# Patient Record
Sex: FEMALE | Race: WHITE | Employment: FULL TIME | URBAN - METROPOLITAN AREA
[De-identification: names, ages, dates, MRNs, and addresses within clinical notes are randomized per-mention and may not be internally consistent; named-entity substitution may affect disease eponyms.]

---
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
## 2021-01-07 ENCOUNTER — HOSPITAL ENCOUNTER (OUTPATIENT)
Dept: LAB | Age: 21
Discharge: HOME | End: 2021-01-07
Payer: COMMERCIAL

## 2021-01-07 LAB
ERYTHROCYTE [SEDIMENTATION RATE] IN BLOOD: 12 MM/HR (ref 0–20)
RHEUMATOID FACT SERPL-ACNC: <10 IU/ML
URATE SERPL-MCNC: 4.7 MG/DL (ref 2.6–7.2)

## 2021-01-07 PROCEDURE — 86038 ANTINUCLEAR ANTIBODIES: CPT

## 2021-01-07 PROCEDURE — 84550 ASSAY OF BLOOD/URIC ACID: CPT

## 2021-01-07 PROCEDURE — 86431 RHEUMATOID FACTOR QUANT: CPT

## 2021-01-07 PROCEDURE — 36415 COLL VENOUS BLD VENIPUNCTURE: CPT

## 2021-01-07 PROCEDURE — 85652 RBC SED RATE AUTOMATED: CPT

## 2021-01-08 LAB — ANA TITR SER IF: NEGATIVE {TITER}

## 2022-07-03 ENCOUNTER — HOSPITAL ENCOUNTER (EMERGENCY)
Age: 22
Discharge: HOME | End: 2022-07-03
Attending: EMERGENCY MEDICINE
Payer: COMMERCIAL

## 2022-07-03 ENCOUNTER — APPOINTMENT (OUTPATIENT)
Dept: GENERAL RADIOLOGY | Age: 22
End: 2022-07-03
Attending: EMERGENCY MEDICINE
Payer: COMMERCIAL

## 2022-07-03 VITALS
HEART RATE: 86 BPM | TEMPERATURE: 98.2 F | OXYGEN SATURATION: 98 % | DIASTOLIC BLOOD PRESSURE: 70 MMHG | SYSTOLIC BLOOD PRESSURE: 108 MMHG | RESPIRATION RATE: 16 BRPM

## 2022-07-03 DIAGNOSIS — S52.502A CLOSED FRACTURE OF DISTAL END OF LEFT RADIUS, UNSPECIFIED FRACTURE MORPHOLOGY, INITIAL ENCOUNTER: Primary | ICD-10-CM

## 2022-07-03 PROCEDURE — 73100 X-RAY EXAM OF WRIST: CPT

## 2022-07-03 PROCEDURE — 96376 TX/PRO/DX INJ SAME DRUG ADON: CPT

## 2022-07-03 PROCEDURE — 99152 MOD SED SAME PHYS/QHP 5/>YRS: CPT

## 2022-07-03 PROCEDURE — 74011250636 HC RX REV CODE- 250/636: Performed by: EMERGENCY MEDICINE

## 2022-07-03 PROCEDURE — 99284 EMERGENCY DEPT VISIT MOD MDM: CPT

## 2022-07-03 PROCEDURE — 96375 TX/PRO/DX INJ NEW DRUG ADDON: CPT

## 2022-07-03 PROCEDURE — 96374 THER/PROPH/DIAG INJ IV PUSH: CPT

## 2022-07-03 PROCEDURE — 75810000302 HC ER LEVEL 2 CLOSED TREATMNT FRACTURE/DISLOCATION

## 2022-07-03 RX ORDER — MORPHINE SULFATE 4 MG/ML
4 INJECTION INTRAVENOUS ONCE
Status: COMPLETED | OUTPATIENT
Start: 2022-07-03 | End: 2022-07-03

## 2022-07-03 RX ORDER — MORPHINE SULFATE 4 MG/ML
4 INJECTION INTRAVENOUS
Status: COMPLETED | OUTPATIENT
Start: 2022-07-03 | End: 2022-07-03

## 2022-07-03 RX ORDER — ONDANSETRON 2 MG/ML
4 INJECTION INTRAMUSCULAR; INTRAVENOUS
Status: COMPLETED | OUTPATIENT
Start: 2022-07-03 | End: 2022-07-03

## 2022-07-03 RX ORDER — MIDAZOLAM HYDROCHLORIDE 1 MG/ML
2 INJECTION, SOLUTION INTRAMUSCULAR; INTRAVENOUS ONCE
Status: COMPLETED | OUTPATIENT
Start: 2022-07-03 | End: 2022-07-03

## 2022-07-03 RX ORDER — OXYCODONE AND ACETAMINOPHEN 5; 325 MG/1; MG/1
1 TABLET ORAL
Qty: 6 TABLET | Refills: 0 | Status: SHIPPED | OUTPATIENT
Start: 2022-07-03 | End: 2022-07-07

## 2022-07-03 RX ADMIN — MORPHINE SULFATE 4 MG: 4 INJECTION, SOLUTION INTRAMUSCULAR; INTRAVENOUS at 12:23

## 2022-07-03 RX ADMIN — MIDAZOLAM HYDROCHLORIDE 2 MG: 1 INJECTION, SOLUTION INTRAMUSCULAR; INTRAVENOUS at 14:48

## 2022-07-03 RX ADMIN — ONDANSETRON 4 MG: 2 INJECTION INTRAMUSCULAR; INTRAVENOUS at 12:23

## 2022-07-03 RX ADMIN — MORPHINE SULFATE 4 MG: 4 INJECTION, SOLUTION INTRAMUSCULAR; INTRAVENOUS at 14:48

## 2022-07-03 RX ADMIN — MORPHINE SULFATE 4 MG: 4 INJECTION, SOLUTION INTRAMUSCULAR; INTRAVENOUS at 13:47

## 2022-07-03 NOTE — ED PROVIDER NOTES
EMERGENCY DEPARTMENT HISTORY AND PHYSICAL EXAM    12:20 PM seen briefly at this time in room 6      Date: 7/3/2022  Patient Name: David Lewis    History of Presenting Illness     Chief Complaint   Patient presents with    Wrist Pain         History Provided By: patient    Additional History (Context): David Lewis is a 24 y.o. female presents with using a stem behind a lawnmower and fell landing on outstretched left hand and immediate pain with deformity to the distal aspect of the radius. Pain is moderate to severe. Santos Bernal PCP: Sarah Wagner MD    Chief Complaint:   Duration:    Timing:    Location:   Quality:   Severity:   Modifying Factors:   Associated Symptoms:       Current Outpatient Medications   Medication Sig Dispense Refill    oxyCODONE-acetaminophen (Percocet) 5-325 mg per tablet Take 1 Tablet by mouth every four (4) hours as needed for Pain for up to 3 days. Max Daily Amount: 6 Tablets. 6 Tablet 0       Past History     Past Medical History:  No past medical history on file. Past Surgical History:  No past surgical history on file. Family History:  No family history on file. Social History:  Social History     Tobacco Use    Smoking status: Not on file    Smokeless tobacco: Not on file   Substance Use Topics    Alcohol use: Not on file    Drug use: Not on file       Allergies: Allergies   Allergen Reactions    Keflex [Cephalexin] Rash         Review of Systems     Review of Systems   Constitutional: Negative for diaphoresis and fever. HENT: Negative for congestion and sore throat. Eyes: Negative for pain and itching. Respiratory: Negative for cough and shortness of breath. Cardiovascular: Negative for chest pain and palpitations. Gastrointestinal: Negative for abdominal pain and diarrhea. Endocrine: Negative for polydipsia and polyuria. Genitourinary: Negative for dysuria and hematuria. Musculoskeletal: Positive for arthralgias. Negative for myalgias.    Skin: Negative for rash and wound. Neurological: Negative for seizures and syncope. Hematological: Does not bruise/bleed easily. Psychiatric/Behavioral: Negative for agitation and hallucinations. Physical Exam       Patient Vitals for the past 12 hrs:   Temp Pulse Resp BP SpO2   07/03/22 1450 -- 84 18 -- 98 %   07/03/22 1445 -- 92 21 111/87 99 %   07/03/22 1440 -- 99 21 124/74 100 %   07/03/22 1435 -- 82 12 115/73 100 %   07/03/22 1430 -- 88 18 116/77 98 %   07/03/22 1425 -- 91 23 117/76 98 %   07/03/22 1420 -- 85 20 107/68 99 %   07/03/22 1415 -- 75 17 112/72 98 %   07/03/22 1410 -- 66 20 112/78 96 %   07/03/22 1405 -- 81 21 111/60 96 %   07/03/22 1351 98.2 °F (36.8 °C) 65 -- 120/82 96 %   07/03/22 1341 -- 72 16 100/72 100 %   07/03/22 1254 -- 70 16 98/70 100 %   07/03/22 1239 -- 70 16 100/70 98 %   07/03/22 1224 -- -- -- -- 99 %   07/03/22 1201 98.1 °F (36.7 °C) 71 20 93/76 99 %       IPVITALS  Patient Vitals for the past 24 hrs:   BP Temp Pulse Resp SpO2   07/03/22 1450 -- -- 84 18 98 %   07/03/22 1445 111/87 -- 92 21 99 %   07/03/22 1440 124/74 -- 99 21 100 %   07/03/22 1435 115/73 -- 82 12 100 %   07/03/22 1430 116/77 -- 88 18 98 %   07/03/22 1425 117/76 -- 91 23 98 %   07/03/22 1420 107/68 -- 85 20 99 %   07/03/22 1415 112/72 -- 75 17 98 %   07/03/22 1410 112/78 -- 66 20 96 %   07/03/22 1405 111/60 -- 81 21 96 %   07/03/22 1351 120/82 98.2 °F (36.8 °C) 65 -- 96 %   07/03/22 1341 100/72 -- 72 16 100 %   07/03/22 1254 98/70 -- 70 16 100 %   07/03/22 1239 100/70 -- 70 16 98 %   07/03/22 1224 -- -- -- -- 99 %   07/03/22 1201 93/76 98.1 °F (36.7 °C) 71 20 99 %       Physical Exam  Vitals and nursing note reviewed. Constitutional:       Appearance: She is well-developed. HENT:      Head: Normocephalic and atraumatic. Eyes:      General: No scleral icterus. Conjunctiva/sclera: Conjunctivae normal.   Neck:      Vascular: No JVD. Cardiovascular:      Rate and Rhythm: Normal rate and regular rhythm. Heart sounds: Normal heart sounds. Comments: 4 intact extremity pulses  Pulmonary:      Effort: Pulmonary effort is normal.      Breath sounds: Normal breath sounds. Abdominal:      Palpations: Abdomen is soft. There is no mass. Tenderness: There is no abdominal tenderness. Musculoskeletal:      Cervical back: Normal range of motion and neck supple. Comments: Dorsal angulation at the distal radius on the left, neurovascularly intact no skin defect. Lymphadenopathy:      Cervical: No cervical adenopathy. Skin:     General: Skin is warm and dry. Neurological:      Mental Status: She is alert. Diagnostic Study Results   Labs -  No results found for this or any previous visit (from the past 24 hour(s)). Radiologic Studies -   XR WRIST LT AP/LAT   Final Result      Acute comminuted fracture through the distal radius with intra-articular   extension as described. Possible additional ulnar styloid fracture as described. Fracture fragments project dorsal to the distal ulna and proximal carpal row on   lateral view with uncertain donor site as described. XR WRIST LT AP/LAT    Result Date: 7/3/2022   LEFT WRIST, TWO VIEWS CPT CODE: 88749 INDICATION: Above. Status post fall with left wrist deformity COMPARISON: None. TECHNIQUE: AP and lateral views of the left wrist are reviewed. FINDINGS: Acute comminuted fracture through the distal radius with intra-articular extension. There is mild to moderate angulation, apex volar. Mild distraction of fracture fragments from the radius both medially and laterally. Associated soft tissue swelling and deformity noted. Mild irregularity at the ulnar styloid on AP view, possible ulnar styloid fracture.  On lateral projection there are small irregular calcific densities projected dorsal to the wrist distal to the radial fracture and posterior to the ulnar styloid suspicious for avulsion fracture fragment, donor site not well-defined, could potentially be radius, ulna, or proximal carpal row. Acute comminuted fracture through the distal radius with intra-articular extension as described. Possible additional ulnar styloid fracture as described. Fracture fragments project dorsal to the distal ulna and proximal carpal row on lateral view with uncertain donor site as described. Medications ordered:   Medications   morphine injection 4 mg (4 mg IntraVENous Given 7/3/22 1223)   ondansetron (ZOFRAN) injection 4 mg (4 mg IntraVENous Given 7/3/22 1223)   morphine injection 4 mg (4 mg IntraVENous Given 7/3/22 1347)   morphine injection 4 mg (4 mg IntraVENous Given 7/3/22 1448)   midazolam (VERSED) injection 2 mg (2 mg IntraVENous Given 7/3/22 1448)         Medical Decision Making   Initial Medical Decision Making and DDx:     X-rays reveal intra-articular left distal radius fracture with dorsal angulation    ED Course: Progress Notes, Reevaluation, and Consults:  ED Course as of 07/03/22 1506   Sun Jul 03, 2022   1403 Patient consented for both reduction and moderate sedation with mother present in the room questions answered [CB]      ED Course User Index  [CB] Maik Stovall MD     Procedural Sedation    Date/Time: 7/3/2022 2:17 PM  Performed by: Maik Stovall MD  Authorized by: Maik Stovall MD     Consent:     Consent obtained:  Written    Consent given by:  Patient    Risks discussed:   Allergic reaction, vomiting, respiratory compromise necessitating ventilatory assistance and intubation, prolonged sedation necessitating reversal and prolonged hypoxia resulting in organ damage  Indications:     Procedure performed:  Fracture reduction    Procedure necessitating sedation performed by:  Physician performing sedation    Intended level of sedation:  Moderate (conscious sedation)  Pre-sedation assessment:     Time since last food or drink:  6    ASA classification: class 1 - normal, healthy patient      Neck mobility: normal      Mouth opening:  3 or more finger widths    Thyromental distance:  4 finger widths    Mallampati score:  I - soft palate, uvula, fauces, pillars visible    Pre-sedation assessments completed and reviewed: airway patency and mental status      History of difficult intubation: no      Pre-sedation assessment completed:  7/3/2022 2:18 PM  Immediate pre-procedure details:     Reassessment: Patient reassessed immediately prior to procedure      Reviewed: vital signs      Verified: bag valve mask available    Procedure details (see MAR for exact dosages):     Sedation start time:  7/3/2022 2:55 PM    Preoxygenation:  Room air    Sedation:  Midazolam    Analgesia:  Morphine    Intra-procedure monitoring:  Blood pressure monitoring, cardiac monitor and continuous pulse oximetry    Intra-procedure events: none      Sedation end time:  7/3/2022 3:03 PM  Post-procedure details:     Post-sedation assessment completed:  7/3/2022 3:05 PM    Attendance: Constant attendance by certified staff until patient recovered      Recovery: Patient returned to pre-procedure baseline      Post-sedation assessments completed and reviewed: airway patency and mental status      Patient tolerance: Tolerated well, no immediate complications    Reduction of Joint    Date/Time: 7/3/2022 3:03 PM  Performed by: Herman Bae MD  Authorized by: Herman Bae MD     Consent:     Consent obtained:  Written    Consent given by:  Patient    Risks discussed:  Nerve damage and vascular damage    Alternatives discussed:  No treatment  Injury:     Injury location: Left distal radius fracture. Pre-procedure assessment:     Neurological function: normal      Distal perfusion: normal      Range of motion: normal    Sedation:     Sedation type:   Moderate (conscious) sedation  Procedure details:     Manipulation performed: yes      Skeletal traction used: yes      Immobilization:  Splint    Splint type:  Volar short arm  Post-procedure details:     Neurological function: normal      Distal perfusion: normal      Patient tolerance of procedure: Tolerated well, no immediate complications        I am the first provider for this patient. I reviewed the vital signs, available nursing notes, past medical history, past surgical history, family history and social history. Patient Vitals for the past 12 hrs:   Temp Pulse Resp BP SpO2   07/03/22 1450 -- 84 18 -- 98 %   07/03/22 1445 -- 92 21 111/87 99 %   07/03/22 1440 -- 99 21 124/74 100 %   07/03/22 1435 -- 82 12 115/73 100 %   07/03/22 1430 -- 88 18 116/77 98 %   07/03/22 1425 -- 91 23 117/76 98 %   07/03/22 1420 -- 85 20 107/68 99 %   07/03/22 1415 -- 75 17 112/72 98 %   07/03/22 1410 -- 66 20 112/78 96 %   07/03/22 1405 -- 81 21 111/60 96 %   07/03/22 1351 98.2 °F (36.8 °C) 65 -- 120/82 96 %   07/03/22 1341 -- 72 16 100/72 100 %   07/03/22 1254 -- 70 16 98/70 100 %   07/03/22 1239 -- 70 16 100/70 98 %   07/03/22 1224 -- -- -- -- 99 %   07/03/22 1201 98.1 °F (36.7 °C) 71 20 93/76 99 %       Vital Signs-Reviewed the patient's vital signs. Pulse Oximetry Analysis, Cardiac Monitor, 12 lead ekg:      Interpreted by the EP. Records Reviewed: Nursing notes reviewed (Time of Review: 12:20 PM)    Procedures:   Critical Care Time:   Aspirin: (was aspirin given for stroke?)    Diagnosis     Clinical Impression:   1. Closed fracture of distal end of left radius, unspecified fracture morphology, initial encounter        Disposition: Discharged      Follow-up Information     Follow up With Specialties Details Why Contact Kun Mitchell DO Orthopedic Surgery, Hand Surgery Physician In 2 days  Maurice Gironusha  [phone]             Patient's Medications   Start Taking    OXYCODONE-ACETAMINOPHEN (PERCOCET) 5-325 MG PER TABLET    Take 1 Tablet by mouth every four (4) hours as needed for Pain for up to 3 days. Max Daily Amount: 6 Tablets. Continue Taking    No medications on file   These Medications have changed    No medications on file   Stop Taking    No medications on file     _______________________________    Notes:    Naseem Claire MD using Aydin dictbrandy      _______________________________

## 2022-07-03 NOTE — ED NOTES
I have reviewed discharge instruction and prescriptions with pt and mother. Pt and mother verbalized understanding and has no further questions at this time. Education taught and patient verbalized understanding of education. Teach back method used. 20G IV removed, catheter tip intact on removal.  Armband removed and shredded per patients request.    Patients pain 3/10. Belongings are with pt. Patient discharged with self and mother to home.

## 2022-07-06 ENCOUNTER — OFFICE VISIT (OUTPATIENT)
Dept: ORTHOPEDIC SURGERY | Age: 22
End: 2022-07-06
Payer: COMMERCIAL

## 2022-07-06 VITALS
TEMPERATURE: 97.8 F | HEART RATE: 96 BPM | WEIGHT: 195.4 LBS | HEIGHT: 62 IN | OXYGEN SATURATION: 98 % | SYSTOLIC BLOOD PRESSURE: 128 MMHG | BODY MASS INDEX: 35.96 KG/M2 | DIASTOLIC BLOOD PRESSURE: 88 MMHG

## 2022-07-06 DIAGNOSIS — S52.572A OTHER CLOSED INTRA-ARTICULAR FRACTURE OF DISTAL END OF LEFT RADIUS, INITIAL ENCOUNTER: Primary | ICD-10-CM

## 2022-07-06 PROCEDURE — 99204 OFFICE O/P NEW MOD 45 MIN: CPT | Performed by: ORTHOPAEDIC SURGERY

## 2022-07-06 NOTE — LETTER
Patient: Yuliana Silva                     PROCEDURE: Left Distal Radius ORIF    PRE OPERATIVE INSTRUCTIONS:  Five (5) days prior to surgery STOP taking any hormonal medications, aspirin, fish oil and/or anti-inflammatory medications (this includes ibuprofens and Aleve). Tylenol is okay during this time. If you are taking blood thinner medication (such as Coumadin, Plavix, Heparin or others) you will need special instructions from the prescribing physician. LABS:    o NO LABS NECESSARY      Surgery Date: 7/12/2022  Time: 12:00pm  Report to [facility] on the [address] [place] [address] at: 10:00am    THE DAY OF SURGERY:         1. Do not eat, chew gum or drink anything after midnight prior to the date of your surgery. 2. Take your blood pressure and/or heart medications with small sips of water unless otherwise instructed. If you are insulin dependent, bring your insulin with you, unless otherwise instructed. 3. Bring a list of your medications and the dosage to the hospital, including  vitamins. 4. Do not wear nail polish, acrylic nails, make-up, jewelry, perfumes or skin  creams. 5. Do not bring valuables or money to the hospital.        6. You MUST have a responsible adult arranged to drive you home following surgery. It is recommended that they stay with you for 24 hours after surgery. Post op visit  appointment is scheduled with Dr. Ct Willson       On 7/27/2022 @ 8:20am at the [facility] office.       Ingrid Tam, Surgery Scheduler  [phone]

## 2022-07-06 NOTE — LETTER
[facility] Scheduling office use only)  Case No: _______________       :_______________     Date: ___________________  [facility]    Surgery Request Form for the Operating Room at [facility]  Fax this form to [phone]                                          Telephone: [phone]      To be completed by Physician Office:    Date: 2022          Form completed by: Nette Ellsworth    Phone No: [phone]            Fax No: [phone]      Surgery Date: 2022   Case order or Requested Time: 12:15pm  Case Classification _________    Surgeon: Dr. Jessica Price     Assistant Surgeon or P.A.:  Need SA    Are 2 SA's Needed? NO    **Please [place]:            EMERGENT       URGENT         ELECTIVE   CPT CODE*  Procedure   75927 Left Distal Radius ORIF           *CPT code is required for ALL procedures. *Diagnosis ICD10 Code and Description: Other closed intra-articular fracture of distal end of left radius, initial encounter  S52.572A    Patient Information: (*) required field   Please provide name as it appears in [facility]    *Name: Danny Goodrich  *SSN: [id number]  *: 2000 *Gender female    *Best Contact Phone No: [phone] (home)      Alternate Phone No:     *Primary Insurance: [employer] Number: [id number]  *If self-pay, please fax 'Self-Pay/Jennifer Verification' Form with this request. Elective cases will not be scheduled until approved. *Insurance Authorization: pending    Secondary Insurance: [address] [place] [address] Number: [id number]     *Insurance Authorization: None    Latex Allergy: Yes: ____ No: _X___   Are all allergies listed in CC? Yes: ______ No: __X____         *Outpatient    *Anesthesia Type: General Nerve Block Type: Supraclavicular Block    Comments/Special Equipment and/or : Acumed    contacted?  Yes    MINI C-Arm, Hand Table      COVID-19 VACCINE___X___YES*____NO/  SCANNED/DOCUMENTED IN CC _____YES_X___NO  *PATIENT HAS BEEN FULLY VACCINATED 2 WEEKS PRIOR TO THE PROCEDURE*    ___________________________________ Date__________________ Time:________________  Surgeon's Signature    The information contained in this fax message is intended only for the personal and confidential use of [address]'s Surgery Scheduling Office. If the   reader is not the intended recipient or an agent responsible for delivering it to the intended recipient, you are hereby notified that you have received this document in error, and that any review, dissemination, distribution, or copying of this message is strictly prohibited. Please notify us immediately by telephone [phone] of this error and return the original message to us by mail. Thank you.                                                                                                                                      Revised 3.4.22

## 2022-07-06 NOTE — PROGRESS NOTES
Max Pozo is a 24 y.o. female right handed unspecified employment. Worker's Compensation and legal considerations: none filed. Vitals:    07/06/22 1359 07/06/22 1448   BP:  128/88   Pulse: 96    Temp: 97.8 °F (36.6 °C)    TempSrc: Temporal    SpO2: 98%    Weight: 195 lb 6.4 oz (88.6 kg)    Height: 5' 2\" (1.575 m)    PainSc:   3    PainLoc: Wrist            Chief Complaint   Patient presents with    Wrist Pain     lt         HPI: Patient presents today with a history of a fall onto her left wrist.  She was recently diagnosed with a intra-articular distal radius fracture. Date of onset: 7/3/2022    Injury: Yes: Comment: Fall    Prior Treatment:  Yes: Comment: Splint    Numbness/ Tingling: No      ROS: Review of Systems - General ROS: negative  Psychological ROS: negative  ENT ROS: negative  Allergy and Immunology ROS: negative  Hematological and Lymphatic ROS: negative  Respiratory ROS: no cough, shortness of breath, or wheezing  Cardiovascular ROS: no chest pain or dyspnea on exertion  Gastrointestinal ROS: no abdominal pain, change in bowel habits, or black or bloody stools  Musculoskeletal ROS: negative  Neurological ROS: negative  Dermatological ROS: negative    History reviewed. No pertinent past medical history. History reviewed. No pertinent surgical history. Allergies   Allergen Reactions    Keflex [Cephalexin] Rash           PE:     Physical Exam  Vitals and nursing note reviewed. Constitutional:       General: She is not in acute distress. Appearance: Normal appearance. She is not ill-appearing, toxic-appearing or diaphoretic. HENT:      Head: Normocephalic and atraumatic. Nose: Nose normal.      Mouth/Throat:      Mouth: Mucous membranes are moist.   Eyes:      Extraocular Movements: Extraocular movements intact. Pupils: Pupils are equal, round, and reactive to light. Cardiovascular:      Pulses: Normal pulses.    Pulmonary:      Effort: Pulmonary effort is normal. No respiratory distress. Abdominal:      General: Abdomen is flat. There is no distension. Musculoskeletal:         General: Swelling, tenderness, deformity and signs of injury present. Cervical back: Normal range of motion and neck supple. Right lower leg: No edema. Left lower leg: No edema. Skin:     General: Skin is warm and dry. Capillary Refill: Capillary refill takes less than 2 seconds. Findings: Bruising present. No erythema. Neurological:      General: No focal deficit present. Mental Status: She is alert and oriented to person, place, and time. Psychiatric:         Mood and Affect: Mood normal.         Behavior: Behavior normal.            Left wrist: There is deformity and edema about the wrist.  Range of motion is limited due to stiffness swelling and pain. Sensation grossly intact distally most notably in the median nerve distribution. Imaging:     External plain films reviewed shows a intra-articular distal radius fracture with 100% displacement of the lunate facet. ICD-10-CM ICD-9-CM    1. Other closed intra-articular fracture of distal end of left radius, initial encounter  S52.572A 813.42 SCHEDULE SURGERY      AMB SUPPLY ORDER         Plan:     Schedule left distal radius open reduction internal fixation    Universal wrist brace applied today. The patient was counseled at length about the risks of jewel Covid-19 during their perioperative period and any recovery window from their procedure. The patient was made aware that jewel Covid-19  may worsen their prognosis for recovering from their procedure and lend to a higher morbidity and/or mortality risk. All material risks, benefits, and reasonable alternatives including postponing the procedure were discussed. The patient does  wish to proceed with the procedure at this time.       This procedure has been fully reviewed with the patient and written informed consent has been obtained. 45 minutes was spent discussing operative versus nonoperative treatment, postoperative convalescence, and obtaining informed consent. Follow-up and Dispositions    · Return if symptoms worsen or fail to improve.           Plan was reviewed with patient, who verbalized agreement and understanding of the plan

## 2022-07-07 RX ORDER — DEXTROMETHORPHAN HYDROBROMIDE, GUAIFENESIN 5; 100 MG/5ML; MG/5ML
650 LIQUID ORAL
COMMUNITY
Start: 2022-07-03 | End: 2022-07-12

## 2022-07-07 RX ORDER — IBUPROFEN 200 MG
CAPSULE ORAL
COMMUNITY
Start: 2022-07-03 | End: 2022-07-12

## 2022-07-07 RX ORDER — OXYCODONE HYDROCHLORIDE 5 MG/1
5 TABLET ORAL AS NEEDED
COMMUNITY
Start: 2022-07-05 | End: 2022-07-12

## 2022-07-07 NOTE — PERIOP NOTES
PRE-SURGICAL INSTRUCTIONS        Patient's Name:  Demetris Whitmore      GNXLP'Y Date:  7/7/2022            Covid Testing Date and Time:  vaccinated    Surgery Date:  7/12/2022                1. Do NOT eat or drink anything, including candy, gum, or ice chips after midnight on 7/12, unless you have specific instructions from your surgeon or anesthesia provider to do so.  2. You may brush your teeth before coming to the hospital.  3. No smoking 24 hours prior to the day of surgery. 4. No alcohol 24 hours prior to the day of surgery. 5. No recreational drugs for one week prior to the day of surgery. 6. Leave all valuables, including money/purse, at home. 7. Remove all jewelry, nail polish, acrylic nails, and makeup (including mascara); no lotions powders, deodorant, or perfume/cologne/after shave on the skin. 8. Follow instruction for Hibiclens washes and CHG wipes from surgeon's office. 9. Glasses/contact lenses and dentures may be worn to the hospital.  They will be removed prior to surgery. 10. Call your doctor if symptoms of a cold or illness develop within 24-48 hours prior to your surgery. 11.  If you are having an outpatient procedure, please make arrangements for a responsible ADULT TO [address] [place] [address] and stay with you for 24 hours after your surgery. 12. ONE VISITOR in the hospital at this time for outpatient procedures. Exceptions may be made for surgical admissions, per nursing unit guidelines      Special Instructions:      Bring list of CURRENT medications. Bring any pertinent legal medical records. Take these medications the morning of surgery with a sip of water:  As directed by MD  Follow physician instructions about stopping anticoagulants. On the day of surgery, come in the main entrance of [facility]. Let the  at the desk know you are there for surgery. A staff member will come escort you to the surgical area on the second floor.     If you have any questions or concerns, please do not hesitate to call:     (Prior to the day of surgery) PAT department:  [phone]   (Day of surgery) Pre-Op department:  [phone]    These surgical instructions were reviewed with Angela Nielsen during the [facility] phone call. Yes

## 2022-07-11 ENCOUNTER — ANESTHESIA EVENT (OUTPATIENT)
Dept: SURGERY | Age: 22
End: 2022-07-11
Payer: COMMERCIAL

## 2022-07-11 PROBLEM — S52.572A CLOSED DIE PUNCH FRACTURE OF DISTAL RADIUS, LEFT, INITIAL ENCOUNTER: Status: ACTIVE | Noted: 2022-07-11

## 2022-07-11 NOTE — H&P
Michael Bedoyapool is a 24 y.o. female right handed unspecified employment. Worker's Compensation and legal considerations: none filed.          Vitals:     07/06/22 1359 07/06/22 1448   BP:   128/88   Pulse: 96     Temp: 97.8 °F (36.6 °C)     TempSrc: Temporal     SpO2: 98%     Weight: 195 lb 6.4 oz (88.6 kg)     Height: 5' 2\" (1.575 m)     PainSc:   3     PainLoc: Wrist                      Chief Complaint   Patient presents with    Wrist Pain       lt            HPI: Patient presents today with a history of a fall onto her left wrist.  She was recently diagnosed with a intra-articular distal radius fracture.     Date of onset: 7/3/2022     Injury: Yes: Comment: Fall     Prior Treatment:  Yes: Comment: Splint     Numbness/ Tingling: No        ROS: Review of Systems - General ROS: negative  Psychological ROS: negative  ENT ROS: negative  Allergy and Immunology ROS: negative  Hematological and Lymphatic ROS: negative  Respiratory ROS: no cough, shortness of breath, or wheezing  Cardiovascular ROS: no chest pain or dyspnea on exertion  Gastrointestinal ROS: no abdominal pain, change in bowel habits, or black or bloody stools  Musculoskeletal ROS: negative  Neurological ROS: negative  Dermatological ROS: negative     History reviewed. No pertinent past medical history.     Surgical History   History reviewed. No pertinent surgical history.                   Allergies   Allergen Reactions    Keflex [Cephalexin] Rash               PE:      Physical Exam  Vitals and nursing note reviewed. Constitutional:       General: She is not in acute distress. Appearance: Normal appearance. She is not ill-appearing, toxic-appearing or diaphoretic. HENT:      Head: Normocephalic and atraumatic. Nose: Nose normal.      Mouth/Throat:      Mouth: Mucous membranes are moist.   Eyes:      Extraocular Movements: Extraocular movements intact. Pupils: Pupils are equal, round, and reactive to light.    Cardiovascular: Pulses: Normal pulses. Pulmonary:      Effort: Pulmonary effort is normal. No respiratory distress. Abdominal:      General: Abdomen is flat. There is no distension. Musculoskeletal:         General: Swelling, tenderness, deformity and signs of injury present. Cervical back: Normal range of motion and neck supple. Right lower leg: No edema. Left lower leg: No edema. Skin:     General: Skin is warm and dry. Capillary Refill: Capillary refill takes less than 2 seconds. Findings: Bruising present. No erythema. Neurological:      General: No focal deficit present. Mental Status: She is alert and oriented to person, place, and time. Psychiatric:         Mood and Affect: Mood normal.         Behavior: Behavior normal.               Left wrist: There is deformity and edema about the wrist.  Range of motion is limited due to stiffness swelling and pain. Sensation grossly intact distally most notably in the median nerve distribution.        Imaging:      External plain films reviewed shows a intra-articular distal radius fracture with 100% displacement of the lunate facet.            ICD-10-CM ICD-9-CM     1. Other closed intra-articular fracture of distal end of left radius, initial encounter  S52.572A 813.42 SCHEDULE SURGERY         AMB SUPPLY ORDER            Plan:      Schedule left distal radius open reduction internal fixation     Universal wrist brace applied today.     The patient was counseled at length about the risks of jewel Covid-19 during their perioperative period and any recovery window from their procedure.  The patient was made aware that jewel Covid-19  may worsen their prognosis for recovering from their procedure and lend to a higher morbidity and/or mortality risk.  All material risks, benefits, and reasonable alternatives including postponing the procedure were discussed.  The patient does  wish to proceed with the procedure at this time.        This procedure has been fully reviewed with the patient and written informed consent has been obtained.        45 minutes was spent discussing operative versus nonoperative treatment, postoperative convalescence, and obtaining informed consent.     Follow-up and Dispositions    · Return if symptoms worsen or fail to improve.            Plan was reviewed with patient, who verbalized agreement and understanding of the plan

## 2022-07-12 ENCOUNTER — ANESTHESIA (OUTPATIENT)
Dept: SURGERY | Age: 22
End: 2022-07-12
Payer: COMMERCIAL

## 2022-07-12 ENCOUNTER — HOSPITAL ENCOUNTER (OUTPATIENT)
Age: 22
Setting detail: OUTPATIENT SURGERY
Discharge: HOME | End: 2022-07-12
Attending: ORTHOPAEDIC SURGERY | Admitting: ORTHOPAEDIC SURGERY
Payer: COMMERCIAL

## 2022-07-12 VITALS
TEMPERATURE: 97.2 F | RESPIRATION RATE: 26 BRPM | BODY MASS INDEX: 35.77 KG/M2 | HEIGHT: 62 IN | HEART RATE: 92 BPM | WEIGHT: 194.4 LBS | DIASTOLIC BLOOD PRESSURE: 65 MMHG | OXYGEN SATURATION: 95 % | SYSTOLIC BLOOD PRESSURE: 102 MMHG

## 2022-07-12 DIAGNOSIS — S52.572A CLOSED DIE PUNCH FRACTURE OF DISTAL RADIUS, LEFT, INITIAL ENCOUNTER: Primary | ICD-10-CM

## 2022-07-12 LAB — HCG UR QL: NEGATIVE

## 2022-07-12 PROCEDURE — 77030018836 HC SOL IRR NACL ICUM -A: Performed by: ORTHOPAEDIC SURGERY

## 2022-07-12 PROCEDURE — 76060000034 HC ANESTHESIA 1.5 TO 2 HR: Performed by: ORTHOPAEDIC SURGERY

## 2022-07-12 PROCEDURE — 76210000006 HC OR PH I REC 0.5 TO 1 HR: Performed by: ORTHOPAEDIC SURGERY

## 2022-07-12 PROCEDURE — 77030010509 HC AIRWY LMA MSK TELE -A: Performed by: ANESTHESIOLOGY

## 2022-07-12 PROCEDURE — 77030003737 HC BIT DRL ACMD -C: Performed by: ORTHOPAEDIC SURGERY

## 2022-07-12 PROCEDURE — C1713 ANCHOR/SCREW BN/BN,TIS/BN: HCPCS | Performed by: ORTHOPAEDIC SURGERY

## 2022-07-12 PROCEDURE — 77030040922 HC BLNKT HYPOTHRM STRY -A: Performed by: ORTHOPAEDIC SURGERY

## 2022-07-12 PROCEDURE — 77030040356 HC CORD BPLR FRCP COVD -A: Performed by: ORTHOPAEDIC SURGERY

## 2022-07-12 PROCEDURE — 2709999900 HC NON-CHARGEABLE SUPPLY: Performed by: ORTHOPAEDIC SURGERY

## 2022-07-12 PROCEDURE — 64415 NJX AA&/STRD BRCH PLXS IMG: CPT | Performed by: ANESTHESIOLOGY

## 2022-07-12 PROCEDURE — 76942 ECHO GUIDE FOR BIOPSY: CPT | Performed by: ANESTHESIOLOGY

## 2022-07-12 PROCEDURE — 81025 URINE PREGNANCY TEST: CPT

## 2022-07-12 PROCEDURE — 77030003601 HC NDL NRV BLK BBMI -A: Performed by: ORTHOPAEDIC SURGERY

## 2022-07-12 PROCEDURE — 74011000250 HC RX REV CODE- 250: Performed by: ORTHOPAEDIC SURGERY

## 2022-07-12 PROCEDURE — 77030010507 HC ADH SKN DERMBND J&J -B: Performed by: ORTHOPAEDIC SURGERY

## 2022-07-12 PROCEDURE — 25609 OPTX DST RD XART FX/EP SEP3+: CPT | Performed by: ORTHOPAEDIC SURGERY

## 2022-07-12 PROCEDURE — 74011250636 HC RX REV CODE- 250/636: Performed by: ANESTHESIOLOGY

## 2022-07-12 PROCEDURE — 76010000153 HC OR TIME 1.5 TO 2 HR: Performed by: ORTHOPAEDIC SURGERY

## 2022-07-12 PROCEDURE — 77030008829 HC WRE K ACMD -B: Performed by: ORTHOPAEDIC SURGERY

## 2022-07-12 PROCEDURE — 74011250636 HC RX REV CODE- 250/636: Performed by: NURSE ANESTHETIST, CERTIFIED REGISTERED

## 2022-07-12 PROCEDURE — 77030040361 HC SLV COMPR DVT MDII -B: Performed by: ORTHOPAEDIC SURGERY

## 2022-07-12 PROCEDURE — 77030002933 HC SUT MCRYL J&J -A: Performed by: ORTHOPAEDIC SURGERY

## 2022-07-12 PROCEDURE — 74011000250 HC RX REV CODE- 250: Performed by: NURSE ANESTHETIST, CERTIFIED REGISTERED

## 2022-07-12 PROCEDURE — L3650 SO 8 ABD RESTRAINT PRE OTS: HCPCS | Performed by: ORTHOPAEDIC SURGERY

## 2022-07-12 PROCEDURE — 77030006689 HC BLD OPHTH BVR BD -A: Performed by: ORTHOPAEDIC SURGERY

## 2022-07-12 PROCEDURE — 76210000021 HC REC RM PH II 0.5 TO 1 HR: Performed by: ORTHOPAEDIC SURGERY

## 2022-07-12 PROCEDURE — 74011000258 HC RX REV CODE- 258: Performed by: NURSE ANESTHETIST, CERTIFIED REGISTERED

## 2022-07-12 PROCEDURE — 77030003736 HC BIT DRL ACMD -B: Performed by: ORTHOPAEDIC SURGERY

## 2022-07-12 PROCEDURE — 01830 ANES ARTHR/NDSC WRST/HND NOS: CPT | Performed by: NURSE ANESTHETIST, CERTIFIED REGISTERED

## 2022-07-12 PROCEDURE — 01830 ANES ARTHR/NDSC WRST/HND NOS: CPT | Performed by: ANESTHESIOLOGY

## 2022-07-12 PROCEDURE — 74011250637 HC RX REV CODE- 250/637: Performed by: NURSE ANESTHETIST, CERTIFIED REGISTERED

## 2022-07-12 DEVICE — 3.5MM X 12MM LOCKING HEXALOBE SCREW
Type: IMPLANTABLE DEVICE | Site: WRIST | Status: FUNCTIONAL
Brand: ACUMED

## 2022-07-12 DEVICE — 2.3MM X 14MM LOCKING CORTICAL SCREW
Type: IMPLANTABLE DEVICE | Site: WRIST | Status: FUNCTIONAL
Brand: ACUMED

## 2022-07-12 DEVICE — 3.5MM X 14MM LOCKING HEXALOBE SCREW
Type: IMPLANTABLE DEVICE | Site: WRIST | Status: FUNCTIONAL
Brand: ACUMED

## 2022-07-12 DEVICE — 2.3MM X 18MM LOCKING CORTICAL SCREW
Type: IMPLANTABLE DEVICE | Site: WRIST | Status: FUNCTIONAL
Brand: ACUMED

## 2022-07-12 DEVICE — 2.3MM X 20MM LOCKING CORTICAL SCREW
Type: IMPLANTABLE DEVICE | Site: WRIST | Status: FUNCTIONAL
Brand: ACUMED

## 2022-07-12 DEVICE — 2.3MM X 16MM LOCKING CORTICAL SCREW
Type: IMPLANTABLE DEVICE | Site: WRIST | Status: FUNCTIONAL
Brand: ACUMED

## 2022-07-12 DEVICE — 3.5MM X 12MM NON-LOCKING HEXALOBE SCREW
Type: IMPLANTABLE DEVICE | Site: WRIST | Status: FUNCTIONAL
Brand: ACUMED

## 2022-07-12 DEVICE — ACU-LOC® 2 VDR PROX PLT, NARROW, L
Type: IMPLANTABLE DEVICE | Site: WRIST | Status: FUNCTIONAL
Brand: ACUMED

## 2022-07-12 RX ORDER — LIDOCAINE HYDROCHLORIDE 10 MG/ML
3 INJECTION, SOLUTION EPIDURAL; INFILTRATION; INTRACAUDAL; PERINEURAL ONCE
Status: DISCONTINUED | OUTPATIENT
Start: 2022-07-12 | End: 2022-07-12 | Stop reason: HOSPADM

## 2022-07-12 RX ORDER — SODIUM CHLORIDE 900 MG/100ML
INJECTION INTRAVENOUS
Status: DISCONTINUED | OUTPATIENT
Start: 2022-07-12 | End: 2022-07-12

## 2022-07-12 RX ORDER — SODIUM CHLORIDE 0.9 % (FLUSH) 0.9 %
5-40 SYRINGE (ML) INJECTION AS NEEDED
Status: DISCONTINUED | OUTPATIENT
Start: 2022-07-12 | End: 2022-07-12 | Stop reason: HOSPADM

## 2022-07-12 RX ORDER — DEXAMETHASONE SODIUM PHOSPHATE 4 MG/ML
INJECTION, SOLUTION INTRA-ARTICULAR; INTRALESIONAL; INTRAMUSCULAR; INTRAVENOUS; SOFT TISSUE
Status: COMPLETED | OUTPATIENT
Start: 2022-07-12 | End: 2022-07-12

## 2022-07-12 RX ORDER — DEXAMETHASONE SODIUM PHOSPHATE 4 MG/ML
INJECTION, SOLUTION INTRA-ARTICULAR; INTRALESIONAL; INTRAMUSCULAR; INTRAVENOUS; SOFT TISSUE AS NEEDED
Status: DISCONTINUED | OUTPATIENT
Start: 2022-07-12 | End: 2022-07-12 | Stop reason: HOSPADM

## 2022-07-12 RX ORDER — PROPOFOL 10 MG/ML
VIAL (ML) INTRAVENOUS
Status: DISCONTINUED | OUTPATIENT
Start: 2022-07-12 | End: 2022-07-12 | Stop reason: HOSPADM

## 2022-07-12 RX ORDER — PHENYLEPHRINE HCL IN 0.9% NACL 1 MG/10 ML
SYRINGE (ML) INTRAVENOUS AS NEEDED
Status: DISCONTINUED | OUTPATIENT
Start: 2022-07-12 | End: 2022-07-12 | Stop reason: HOSPADM

## 2022-07-12 RX ORDER — KETOROLAC TROMETHAMINE 15 MG/ML
INJECTION, SOLUTION INTRAMUSCULAR; INTRAVENOUS AS NEEDED
Status: DISCONTINUED | OUTPATIENT
Start: 2022-07-12 | End: 2022-07-12 | Stop reason: HOSPADM

## 2022-07-12 RX ORDER — GLYCOPYRROLATE 0.2 MG/ML
INJECTION INTRAMUSCULAR; INTRAVENOUS AS NEEDED
Status: DISCONTINUED | OUTPATIENT
Start: 2022-07-12 | End: 2022-07-12 | Stop reason: HOSPADM

## 2022-07-12 RX ORDER — SODIUM CHLORIDE, SODIUM LACTATE, POTASSIUM CHLORIDE, CALCIUM CHLORIDE 600; 310; 30; 20 MG/100ML; MG/100ML; MG/100ML; MG/100ML
25 INJECTION, SOLUTION INTRAVENOUS CONTINUOUS
Status: DISCONTINUED | OUTPATIENT
Start: 2022-07-12 | End: 2022-07-12 | Stop reason: HOSPADM

## 2022-07-12 RX ORDER — MIDAZOLAM HYDROCHLORIDE 1 MG/ML
2 INJECTION, SOLUTION INTRAMUSCULAR; INTRAVENOUS ONCE
Status: COMPLETED | OUTPATIENT
Start: 2022-07-12 | End: 2022-07-12

## 2022-07-12 RX ORDER — SODIUM CHLORIDE 0.9 % (FLUSH) 0.9 %
5-40 SYRINGE (ML) INJECTION EVERY 8 HOURS
Status: DISCONTINUED | OUTPATIENT
Start: 2022-07-12 | End: 2022-07-12 | Stop reason: HOSPADM

## 2022-07-12 RX ORDER — PROPOFOL 10 MG/ML
INJECTION, EMULSION INTRAVENOUS AS NEEDED
Status: DISCONTINUED | OUTPATIENT
Start: 2022-07-12 | End: 2022-07-12 | Stop reason: HOSPADM

## 2022-07-12 RX ORDER — CLINDAMYCIN PHOSPHATE 150 MG/ML
INJECTION, SOLUTION INTRAVENOUS AS NEEDED
Status: DISCONTINUED | OUTPATIENT
Start: 2022-07-12 | End: 2022-07-12 | Stop reason: HOSPADM

## 2022-07-12 RX ORDER — HYDROMORPHONE HYDROCHLORIDE 2 MG/ML
0.5 INJECTION, SOLUTION INTRAMUSCULAR; INTRAVENOUS; SUBCUTANEOUS
Status: DISCONTINUED | OUTPATIENT
Start: 2022-07-12 | End: 2022-07-12 | Stop reason: HOSPADM

## 2022-07-12 RX ORDER — SODIUM CHLORIDE, SODIUM LACTATE, POTASSIUM CHLORIDE, CALCIUM CHLORIDE 600; 310; 30; 20 MG/100ML; MG/100ML; MG/100ML; MG/100ML
75 INJECTION, SOLUTION INTRAVENOUS CONTINUOUS
Status: DISCONTINUED | OUTPATIENT
Start: 2022-07-12 | End: 2022-07-12 | Stop reason: HOSPADM

## 2022-07-12 RX ORDER — ONDANSETRON 2 MG/ML
4 INJECTION INTRAMUSCULAR; INTRAVENOUS ONCE
Status: DISCONTINUED | OUTPATIENT
Start: 2022-07-12 | End: 2022-07-12 | Stop reason: HOSPADM

## 2022-07-12 RX ORDER — ROPIVACAINE HYDROCHLORIDE 5 MG/ML
30 INJECTION, SOLUTION EPIDURAL; INFILTRATION; PERINEURAL
Status: COMPLETED | OUTPATIENT
Start: 2022-07-12 | End: 2022-07-12

## 2022-07-12 RX ORDER — FAMOTIDINE 20 MG/1
20 TABLET, FILM COATED ORAL ONCE
Status: COMPLETED | OUTPATIENT
Start: 2022-07-12 | End: 2022-07-12

## 2022-07-12 RX ORDER — ONDANSETRON 2 MG/ML
INJECTION INTRAMUSCULAR; INTRAVENOUS AS NEEDED
Status: DISCONTINUED | OUTPATIENT
Start: 2022-07-12 | End: 2022-07-12 | Stop reason: HOSPADM

## 2022-07-12 RX ORDER — ROPIVACAINE HYDROCHLORIDE 5 MG/ML
INJECTION, SOLUTION EPIDURAL; INFILTRATION; PERINEURAL
Status: COMPLETED | OUTPATIENT
Start: 2022-07-12 | End: 2022-07-12

## 2022-07-12 RX ORDER — DICLOFENAC SODIUM 75 MG/1
75 TABLET, DELAYED RELEASE ORAL 2 TIMES DAILY WITH MEALS
Qty: 20 TABLET | Refills: 0 | Status: SHIPPED | OUTPATIENT
Start: 2022-07-12 | End: 2022-07-25 | Stop reason: SDUPTHER

## 2022-07-12 RX ORDER — FENTANYL CITRATE 50 UG/ML
INJECTION, SOLUTION INTRAMUSCULAR; INTRAVENOUS AS NEEDED
Status: DISCONTINUED | OUTPATIENT
Start: 2022-07-12 | End: 2022-07-12 | Stop reason: HOSPADM

## 2022-07-12 RX ORDER — LIDOCAINE HYDROCHLORIDE 20 MG/ML
INJECTION, SOLUTION EPIDURAL; INFILTRATION; INTRACAUDAL; PERINEURAL AS NEEDED
Status: DISCONTINUED | OUTPATIENT
Start: 2022-07-12 | End: 2022-07-12 | Stop reason: HOSPADM

## 2022-07-12 RX ORDER — OXYCODONE AND ACETAMINOPHEN 7.5; 325 MG/1; MG/1
1 TABLET ORAL
Qty: 28 TABLET | Refills: 0 | Status: SHIPPED | OUTPATIENT
Start: 2022-07-12 | End: 2022-07-19

## 2022-07-12 RX ORDER — FENTANYL CITRATE 50 UG/ML
100 INJECTION, SOLUTION INTRAMUSCULAR; INTRAVENOUS ONCE
Status: COMPLETED | OUTPATIENT
Start: 2022-07-12 | End: 2022-07-12

## 2022-07-12 RX ORDER — FENTANYL CITRATE 50 UG/ML
50 INJECTION, SOLUTION INTRAMUSCULAR; INTRAVENOUS AS NEEDED
Status: DISCONTINUED | OUTPATIENT
Start: 2022-07-12 | End: 2022-07-12 | Stop reason: HOSPADM

## 2022-07-12 RX ADMIN — SODIUM CHLORIDE, POTASSIUM CHLORIDE, SODIUM LACTATE AND CALCIUM CHLORIDE 75 ML/HR: 600; 310; 30; 20 INJECTION, SOLUTION INTRAVENOUS at 11:39

## 2022-07-12 RX ADMIN — LIDOCAINE HYDROCHLORIDE 50 MG: 20 INJECTION, SOLUTION EPIDURAL; INFILTRATION; INTRACAUDAL; PERINEURAL at 15:15

## 2022-07-12 RX ADMIN — DEXMEDETOMIDINE HYDROCHLORIDE 4 MCG: 100 INJECTION, SOLUTION, CONCENTRATE INTRAVENOUS at 16:12

## 2022-07-12 RX ADMIN — FENTANYL CITRATE 25 MCG: 50 INJECTION, SOLUTION INTRAMUSCULAR; INTRAVENOUS at 15:26

## 2022-07-12 RX ADMIN — ROPIVACAINE HYDROCHLORIDE 20 ML: 5 INJECTION, SOLUTION EPIDURAL; INFILTRATION; PERINEURAL at 12:04

## 2022-07-12 RX ADMIN — FENTANYL CITRATE 25 MCG: 50 INJECTION, SOLUTION INTRAMUSCULAR; INTRAVENOUS at 15:49

## 2022-07-12 RX ADMIN — PROPOFOL 140 MCG/KG/MIN: 10 INJECTION, EMULSION INTRAVENOUS at 15:17

## 2022-07-12 RX ADMIN — Medication 200 MCG: at 16:57

## 2022-07-12 RX ADMIN — PROPOFOL 50 MG: 10 INJECTION, EMULSION INTRAVENOUS at 15:38

## 2022-07-12 RX ADMIN — CLINDAMYCIN PHOSPHATE 900 MG: 150 INJECTION, SOLUTION INTRAVENOUS at 15:22

## 2022-07-12 RX ADMIN — PROPOFOL 200 MG: 10 INJECTION, EMULSION INTRAVENOUS at 15:15

## 2022-07-12 RX ADMIN — KETOROLAC TROMETHAMINE 15 MG: 15 INJECTION, SOLUTION INTRAMUSCULAR; INTRAVENOUS at 16:34

## 2022-07-12 RX ADMIN — DEXMEDETOMIDINE HYDROCHLORIDE 8 MCG: 100 INJECTION, SOLUTION, CONCENTRATE INTRAVENOUS at 15:17

## 2022-07-12 RX ADMIN — DEXMEDETOMIDINE HYDROCHLORIDE 4 MCG: 100 INJECTION, SOLUTION, CONCENTRATE INTRAVENOUS at 16:00

## 2022-07-12 RX ADMIN — FENTANYL CITRATE 25 MCG: 50 INJECTION, SOLUTION INTRAMUSCULAR; INTRAVENOUS at 15:59

## 2022-07-12 RX ADMIN — GLYCOPYRROLATE 0.1 MG: 0.2 INJECTION INTRAMUSCULAR; INTRAVENOUS at 15:11

## 2022-07-12 RX ADMIN — ROPIVACAINE HYDROCHLORIDE 20 ML: 5 INJECTION, SOLUTION EPIDURAL; INFILTRATION; PERINEURAL at 12:13

## 2022-07-12 RX ADMIN — Medication 100 MCG: at 15:35

## 2022-07-12 RX ADMIN — FENTANYL CITRATE 25 MCG: 50 INJECTION, SOLUTION INTRAMUSCULAR; INTRAVENOUS at 15:41

## 2022-07-12 RX ADMIN — DEXAMETHASONE SODIUM PHOSPHATE 10 MG: 4 INJECTION, SOLUTION INTRAMUSCULAR; INTRAVENOUS at 12:13

## 2022-07-12 RX ADMIN — PROPOFOL 100 MG: 10 INJECTION, EMULSION INTRAVENOUS at 15:16

## 2022-07-12 RX ADMIN — DEXAMETHASONE SODIUM PHOSPHATE 4 MG: 4 INJECTION, SOLUTION INTRA-ARTICULAR; INTRALESIONAL; INTRAMUSCULAR; INTRAVENOUS; SOFT TISSUE at 15:29

## 2022-07-12 RX ADMIN — DEXMEDETOMIDINE HYDROCHLORIDE 4 MCG: 100 INJECTION, SOLUTION, CONCENTRATE INTRAVENOUS at 15:44

## 2022-07-12 RX ADMIN — FENTANYL CITRATE 50 MCG: 50 INJECTION, SOLUTION INTRAMUSCULAR; INTRAVENOUS at 12:03

## 2022-07-12 RX ADMIN — ONDANSETRON 4 MG: 2 INJECTION INTRAMUSCULAR; INTRAVENOUS at 16:29

## 2022-07-12 RX ADMIN — FAMOTIDINE 20 MG: 20 TABLET ORAL at 11:39

## 2022-07-12 RX ADMIN — MIDAZOLAM 2 MG: 1 INJECTION INTRAMUSCULAR; INTRAVENOUS at 12:03

## 2022-07-12 RX ADMIN — GLYCOPYRROLATE 0.1 MG: 0.2 INJECTION INTRAMUSCULAR; INTRAVENOUS at 15:44

## 2022-07-12 NOTE — ANESTHESIA PREPROCEDURE EVALUATION
Relevant Problems   No relevant active problems       Anesthetic History     Malignant hyperthermia     Comments: Biological father has family member with [address]     Review of Systems / Medical History  Patient summary reviewed, nursing notes reviewed and pertinent labs reviewed    Pulmonary  Within defined limits                 Neuro/Psych   Within defined limits           Cardiovascular                  Exercise tolerance: >4 METS     GI/Hepatic/Renal  Within defined limits              Endo/Other  Within defined limits           Other Findings            Physical Exam    Airway  Mallampati: II  TM Distance: 4 - 6 cm  Neck ROM: normal range of motion   Mouth opening: Normal     Cardiovascular  Regular rate and rhythm,  S1 and S2 normal,  no murmur, click, rub, or gallop  Rhythm: regular  Rate: normal         Dental  No notable dental hx       Pulmonary  Breath sounds clear to auscultation               Abdominal  GI exam deferred       Other Findings            Anesthetic Plan    ASA: 2  Anesthesia type: general      Post-op pain plan if not by surgeon: peripheral nerve block single    Induction: Intravenous  Anesthetic plan and risks discussed with: Patient

## 2022-07-12 NOTE — ANESTHESIA POSTPROCEDURE EVALUATION
Procedure(s):  LEFT DISTAL RADIUS OPEN REDUCTION INTERNAL FIXATION/MINI C-ARM/ACUMED/SUPRACLAVICULAR BLOCK.    general    Anesthesia Post Evaluation      Multimodal analgesia: multimodal analgesia used between 6 hours prior to anesthesia start to PACU discharge  Patient location during evaluation: bedside  Patient participation: complete - patient participated  Level of consciousness: awake  Pain management: adequate  Airway patency: patent  Anesthetic complications: no  Cardiovascular status: stable  Respiratory status: acceptable  Hydration status: acceptable  Post anesthesia nausea and vomiting:  controlled  Final Post Anesthesia Temperature Assessment:  Normothermia (36.0-37.5 degrees C)      INITIAL Post-op Vital signs:   Vitals Value Taken Time   /65 07/12/22 1745   Temp 36.1 °C (97 °F) 07/12/22 1704   Pulse 94 07/12/22 1749   Resp 17 07/12/22 1749   SpO2 95 % 07/12/22 1748   Vitals shown include unvalidated device data.

## 2022-07-12 NOTE — H&P
Update History & Physical    The Patient's History and Physical of July 6, 2022 was reviewed with the patient and I examined the patient. There was no change. The surgical site was confirmed by the patient and me. Plan:  The risk, benefits, expected outcome, and alternative to the recommended procedure have been discussed with the patient. Patient understands and wants to proceed with the procedure.     Electronically signed by nAgel Mcneil DO on 7/12/2022 at 11:35 AM

## 2022-07-12 NOTE — BRIEF OP NOTE
Brief Postoperative Note    Patient: Danny Goodrich  YOB: 2000  MRN: [id number]    Date of Procedure: 7/12/2022     Pre-Op Diagnosis: S52.572A OTHER CLOSED INTRA-ARTICULAR FRACTURE OF DISTAL END OF LEFT RADIUS, INITIAL ENCOUNTER    Post-Op Diagnosis: Same as preoperative diagnosis. Procedure(s):  LEFT DISTAL RADIUS OPEN REDUCTION INTERNAL FIXATION/MINI C-ARM/ACUMED/SUPRACLAVICULAR BLOCK    Surgeon(s): Tiffanie Gonzalez DO    Surgical Assistant: Surg Asst-1: Asif Sebastian    Anesthesia: General     Estimated Blood Loss (mL): less than 50     Complications: None    Specimens: * No specimens in log *     Implants:   Implant Name Type Inv.  Item Serial No.  Lot No. LRB No. Used Action   SCR BNE RYNE LCK DEXTER 2.3X20MM --  - [id number]  SCR BNE RYNE LCK DEXTER 2.3X20MM --   [employer]WD 0000 Left 3 Implanted   SCR BNE RYNE LCK DEXTER 2.3X18MM --  - [id number]  SCR BNE RYNE LCK DEXTER 2.3X18MM --   [employer]WD 0000 Left 1 Implanted   SCR BNE RYNE LCK DEXTER 2.3X14MM --  - [id number]  SCR BNE RYNE LCK DEXTER 2.3X14MM --   [employer]WD 0000 Left 1 Implanted   SCR BNE RYNE LCK DEXTER 2.3X16MM --  - [id number]  SCR BNE RYNE LCK DEXTER 2.3X16MM --   [employer]WD 0000 Left 1 Implanted   SCR BNE NLCK HEXALOBE 3.5X12MM -- F/ELBOW PLT SYS - [id number]  SCR BNE NLCK HEXALOBE 3.5X12MM -- F/ELBOW PLT SYS  [employer]WD 0000 Left 1 Implanted   SCR BNE LCK HEXALOBE 3.5X12MM -- F/ELBOW PLT SYS - [id number]  SCR BNE LCK HEXALOBE 3.5X12MM -- F/ELBOW PLT SYS  [employer]WD 0000 Left 1 Implanted   SCR BNE LCK HEXALOBE 3.5X14MM -- F/ELBOW PLT SYS - [id number]  SCR BNE LCK HEXALOBE 3.5X14MM -- F/ELBOW PLT SYS  [employer]WD 0000 Left 1 Implanted       Drains: * No LDAs found *    Findings: displaced intra-articular fracture    Electronically Signed by Veto Powers DO on 7/12/2022 at 5:06 PM

## 2022-07-12 NOTE — PERIOP NOTES
Pt mother states \"remote family history\" of malignant hyperthermia on father's side of the family. RN notified anesthesia Torres Prather MD and KALYAN Schneider) of pt concerns.

## 2022-07-12 NOTE — DISCHARGE INSTRUCTIONS
Ice and Elevate operative wound/dressing. Begin moving fingers immediately after surgery. Keep dressing clean and dry. Cover for showering. Do not remove dressing. DISCHARGE SUMMARY from Nurse    PATIENT INSTRUCTIONS:    After general anesthesia or intravenous sedation, for 24 hours or while taking prescription Narcotics:  · Limit your activities  · Do not drive and operate hazardous machinery  · Do not make important personal or business decisions  · Do  not drink alcoholic beverages  · If you have not urinated within 8 hours after discharge, please contact your surgeon on call. Report the following to your surgeon:  · Excessive pain, swelling, redness or odor of or around the surgical area  · Temperature over 100.5  · Nausea and vomiting lasting longer than 4 hours or if unable to take medications  · Any signs of decreased circulation or nerve impairment to extremity: change in color, persistent  numbness, tingling, coldness or increase pain  · Any questions    What to do at Home:  Recommended activity: Activity as tolerated and no driving for today. See Dr. Michael Hopson activity instructions. *  Please give a list of your current medications to your Primary Care Provider. *  Please update this list whenever your medications are discontinued, doses are      changed, or new medications (including over-the-counter products) are added. *  Please carry medication information at all times in case of emergency situations. These are general instructions for a healthy lifestyle:    No smoking/ No tobacco products/ Avoid exposure to second hand smoke  Surgeon General's Warning:  Quitting smoking now greatly reduces serious risk to your health.     Obesity, smoking, and sedentary lifestyle greatly increases your risk for illness    A healthy diet, regular physical exercise & weight monitoring are important for maintaining a healthy lifestyle    You may be retaining fluid if you have a history of heart failure or if you experience any of the following symptoms:  Weight gain of 3 pounds or more overnight or 5 pounds in a week, increased swelling in our hands or feet or shortness of breath while lying flat in bed. Please call your doctor as soon as you notice any of these symptoms; do not wait until your next office visit. The discharge information has been reviewed with the patient and mother. The patient and mother verbalized understanding. Discharge medications reviewed with the patient and mother and appropriate educational materials and side effects teaching were provided.   ___________________________________________________________________________________________________________________________________

## 2022-07-12 NOTE — ANESTHESIA PROCEDURE NOTES
Peripheral Block    Start time: 7/12/2022 12:10 PM  End time: 7/12/2022 12:14 PM  Performed by: Chelsea Forrester MD  Authorized by: Chelsea Forrester MD       Pre-procedure: Indications: at surgeon's request, post-op pain management and procedure for pain    Preanesthetic Checklist: patient identified, risks and benefits discussed, site marked, timeout performed, anesthesia consent given and patient being monitored      Block Type:   Block Type:  Brachial plexus and supraclavicular  Laterality:  Left  Monitoring:  Standard ASA monitoring, continuous pulse ox, frequent vital sign checks, oxygen, responsive to questions and heart rate  Injection Technique:  Single shot  Procedures: ultrasound guided and nerve stimulator    Prep: chlorhexidine    Needle Type:  Stimuplex  Needle Gauge:  22 G  Needle Localization:  Ultrasound guidance  Medication Injected:  Dexamethasone (DECADRON) 4 mg/mL injection, 10 mg  ropivacaine (PF) (NAROPIN)(0.5%) 5 mg/mL injection, 20 mL    Assessment:  Number of attempts:  1  Injection Assessment:  No intravascular symptoms, negative aspiration for blood, local visualized surrounding nerve on ultrasound, ultrasound image on chart, no paresthesia and incremental injection every 5 mL  Patient tolerance:  Patient tolerated the procedure well with no immediate complications  Location:  PREOP HOLDING    Patient given 2 mg IV Versed and 50 mcg IV Fentanyl for sedation.     7/12/2022     12:14 PM     Mahesh Woods MD

## 2022-07-13 NOTE — OP NOTES
Operative Report    Patient: Dayna Castro MRN: [id number]  SSN: [id number]    YOB: 2000  Age: 20 y.o. Sex: female       Date of Surgery: 7/12/2022     Preoperative Diagnosis: S52.572A OTHER CLOSED INTRA-ARTICULAR FRACTURE OF DISTAL END OF LEFT RADIUS, INITIAL ENCOUNTER     Postoperative Diagnosis: S52.572A OTHER CLOSED INTRA-ARTICULAR FRACTURE OF DISTAL END OF LEFT RADIUS, INITIAL ENCOUNTER     Surgeon(s) and Role:     Jai Jenkins DO - Primary    Assistant: Chavo Schultz    Anesthesia: General, supraclavicular block, and local    Procedure: Procedure(s):  LEFT DISTAL RADIUS OPEN REDUCTION INTERNAL FIXATION    Findings: Displaced intra-articular 3 part distal radius fracture    Procedure in Detail:     Indications for procedure been outlined in the perioperative documentation, most notably being not amenable to conservative treatment. Informed consent was obtained from the patient. The risks and benefits of the procedure were discussed with the patient. They include but are not limited to neurovascular injury, tendon/ligamentous injury, blood loss, infection, need for further surgery, hematoma, neuroma, seroma, chronic pain, chronic stiffness, complications from anesthesia including death, and the possibility of jewel Covid. After informed consent was obtained, the patient was taken back to the operative suite. A tourniquet was applied to the operative extremity and it was prepped and draped in the normal sterile fashion. The arm was exsanguinated and the tourniquet was elevated to 250 mmHg. Attention was turned to the wrist where an incision was made volarly over the FCR tendon. The subcutaneous tissues were dissected and electrocautery was used for hemostasis. The volar aspect of the FCR sheath was incised and the tendon was retracted ulnarly. The dorsal floor of the sheath was incised and the FPL was identified and retracted ulnarly.   The pronator quadratus was incised and elevated off of the distal radius. The fracture was identified and cleared of any hematoma or fibrous tissue. Using gross reduction means the distal radius was reduced with a K wire if needed. Fluoroscopy was used to aid in the reduction of the distal radius. The appropriate sized plate was fitted to the distal radius with the aid of fluoroscopy to determine how the plate sits distally as well as radially and ulnarly. After the plate was found to be in good position it was pinned distally and proximally. At this point in an ulnar to distal fashion the screws were placed in the distal aspect of the plate. The the distal most ulnar most hole was filled with a nonlocking screw initially to allow for compression of the plate down to the radius. This was followed sequentially in a radial direction. After second screw was placed, the K wire was removed. After all distal holes were filled the radial styloid holes were drilled and filled as appropriate. Confirmation on fluoroscopy was undertaken to determine if both styloid holes needed to be filled. At this point the proximal row screw was placed. The nonlocking screw was subsequently replaced with a locking screw. Attention was then turned to the proximal aspect of the plate where the K wire was removed and the oblong hole was filled using a drill guide. Measurement was taken and after the appropriate size screw was placed, if the fracture needed to be lengthened traction was undertaken while the screw was tightened down. After this the remaining 2 shaft holes were filled with locking screws. Final images were taken in appropriate position of the fracture as well as alignment of the distal radius and wrist joint were assessed on fluoroscopy. Appropriate screw length was also assessed and changed as needed.   The wound was copiously irrigated and quarter percent Marcaine plain mixed with Exparel was injected into the deep tissues, the periosteum surrounding the fracture, and the subcutaneous tissues. The tourniquet was let down and electrocautery was used for hemostasis of any remaining bleeders. The wound was closed with 3-0 Monocryl followed by 4-0 Monocryl and Dermabond in the skin. The patient was placed into a volar splint. She was sent to recovery in stable condition. She was given appropriate wound care instructions, follow-up with me in the outpatient setting, and a prescription for pain medicine. Of note for this procedure there was a dorsal ulnar fragment that had to be captured with the ulnar most screw distally in the plate. This fracture line was intra-articular in addition to the transverse fracture line. Estimated Blood Loss: 20 mL    Tourniquet Time:   Total Tourniquet Time Documented:  Upper Arm (Left) - 52 minutes  Total: Upper Arm (Left) - 52 minutes        Implants:   Implant Name Type Inv.  Item Serial No.  Lot No. LRB No. Used Action   SCR BNE RYNE LCK DEXTER 2.3X20MM --  - [id number]  SCR BNE RYNE LCK DEXTER 2.3X20MM --   [employer]WD 0000 Left 3 Implanted   SCR BNE RYNE LCK DEXTER 2.3X18MM --  - [id number]  SCR BNE RYNE LCK DEXTER 2.3X18MM --   [employer]WD 0000 Left 1 Implanted   SCR BNE RYNE LCK DEXTER 2.3X14MM --  - [id number]  SCR BNE RYNE LCK DEXTER 2.3X14MM --   [employer]WD 0000 Left 1 Implanted   SCR BNE RYNE LCK DEXTER 2.3X16MM --  - [id number]  SCR BNE RYNE LCK DEXTER 2.3X16MM --   [employer]WD 0000 Left 1 Implanted   SCR BNE NLCK HEXALOBE 3.5X12MM -- F/ELBOW PLT SYS - [id number]  SCR BNE NLCK HEXALOBE 3.5X12MM -- F/ELBOW PLT SYS  [employer]WD 0000 Left 1 Implanted   SCR BNE LCK HEXALOBE 3.5X12MM -- F/ELBOW PLT SYS - [id number]  SCR BNE LCK HEXALOBE 3.5X12MM -- F/ELBOW PLT SYS  [employer]WD 0000 Left 1 Implanted   SCR BNE LCK HEXALOBE 3.5X14MM -- F/ELBOW PLT SYS - [id number]  SCR BNE LCK HEXALOBE 3.5X14MM -- F/ELBOW PLT SYS  [employer]WD 0000 Left 1 Implanted   PLATE PROX VDR ERNESTO ACULOC 2 LT --  - [id number]  PLATE PROX VDR ERNESTO ACULOC 2 LT --   [employer]WD NA Left 1 Implanted               Specimens: * No specimens in log *        Drains: None                Complications: None    Counts: Sponge and needle counts were correct times two.     Signed By:  Bren Clarke DO     July 13, 2022

## 2022-07-22 ENCOUNTER — TELEPHONE (OUTPATIENT)
Dept: ORTHOPEDIC SURGERY | Age: 22
End: 2022-07-22

## 2022-07-22 NOTE — TELEPHONE ENCOUNTER
Advised patient of provider's comments below:     \"Please instruct patient to have some one help her unwrap the ace bandage onlyand rewrap it. Advise to be careful not to wrap it too tight. '    Patient expressed understanding.

## 2022-07-22 NOTE — TELEPHONE ENCOUNTER
Patient called in stating she had surgery on 07/12 on her left wrist and the soft cast is starting to get very loose and unraveling. Patient stated that its starting to irritate her skin and was wondering if anyone could re wrap it.        Patient advise callback  Phn# [phone]

## 2022-07-25 RX ORDER — DICLOFENAC SODIUM 75 MG/1
TABLET, DELAYED RELEASE ORAL
Qty: 20 TABLET | Refills: 0 | Status: SHIPPED | OUTPATIENT
Start: 2022-07-25

## 2022-07-27 ENCOUNTER — OFFICE VISIT (OUTPATIENT)
Dept: ORTHOPEDIC SURGERY | Age: 22
End: 2022-07-27
Payer: COMMERCIAL

## 2022-07-27 VITALS — BODY MASS INDEX: 36.36 KG/M2 | HEIGHT: 62 IN | WEIGHT: 197.6 LBS | TEMPERATURE: 97.8 F

## 2022-07-27 DIAGNOSIS — S52.572D OTHER CLOSED INTRA-ARTICULAR FRACTURE OF DISTAL END OF LEFT RADIUS WITH ROUTINE HEALING, SUBSEQUENT ENCOUNTER: Primary | ICD-10-CM

## 2022-07-27 PROCEDURE — 99024 POSTOP FOLLOW-UP VISIT: CPT | Performed by: ORTHOPAEDIC SURGERY

## 2022-07-27 PROCEDURE — 73100 X-RAY EXAM OF WRIST: CPT | Performed by: ORTHOPAEDIC SURGERY

## 2022-07-27 NOTE — PROGRESS NOTES
Vickie James is a 20 y.o. female right handed unspecified employment. Worker's Compensation and legal considerations: none filed. Vitals:    07/27/22 0808   Temp: 97.8 °F (36.6 °C)   TempSrc: Temporal   Weight: 197 lb 9.6 oz (89.6 kg)   Height: 5' 2\" (1.575 m)   PainSc:   0 - No pain   PainLoc: Wrist           Chief Complaint   Patient presents with    Surgical Follow-up     LT distal radius       HPI: Patient presents today for first postoperative appointment 2-week status post left distal radius open reduction internal fixation. She reports no numbness or tingling and reports mild pain today that has been improving every day. Initial/preop HPI: Patient presents today with a history of a fall onto her left wrist.  She was recently diagnosed with a intra-articular distal radius fracture. Date of onset: 7/3/2022    Injury: Yes: Comment: Fall    Prior Treatment:  left distal radius open reduction internal fixation    Numbness/ Tingling: No      ROS: Review of Systems - General ROS: negative  Psychological ROS: negative  ENT ROS: negative  Allergy and Immunology ROS: negative  Hematological and Lymphatic ROS: negative  Respiratory ROS: no cough, shortness of breath, or wheezing  Cardiovascular ROS: no chest pain or dyspnea on exertion  Gastrointestinal ROS: no abdominal pain, change in bowel habits, or black or bloody stools  Musculoskeletal ROS: negative  Neurological ROS: negative  Dermatological ROS: negative    Past Medical History:   Diagnosis Date    Closed fracture of radius, distal end 07/03/2022    left     Hypermobile joints     Malignant hyperthermia due to anesthesia     remote family history on father's side of the family, per pt's mother       Past Surgical History:   Procedure Laterality Date    HX WRIST FRACTURE TX             Allergies   Allergen Reactions    Keflex [Cephalexin] Rash           PE:     Physical Exam  Vitals and nursing note reviewed.    Constitutional: General: She is not in acute distress. Appearance: Normal appearance. She is not ill-appearing, toxic-appearing or diaphoretic. Cardiovascular:      Pulses: Normal pulses. Pulmonary:      Effort: Pulmonary effort is normal. No respiratory distress. Abdominal:      General: Abdomen is flat. There is no distension. Musculoskeletal:         General: Swelling and tenderness present. No deformity or signs of injury. Cervical back: Normal range of motion and neck supple. Right lower leg: No edema. Left lower leg: No edema. Skin:     General: Skin is warm and dry. Capillary Refill: Capillary refill takes less than 2 seconds. Findings: Bruising present. No erythema. Neurological:      General: No focal deficit present. Mental Status: She is alert and oriented to person, place, and time. Psychiatric:         Mood and Affect: Mood normal.         Behavior: Behavior normal.          Left wrist: Incisions clean dry and intact with no drainage breakdown erythema or any signs of infection. Imagin2022 3 views of left wrist does not show any signs of hardware loosening or interval callus at this point. Hardware in appropriate position and fracture still aligned. External plain films reviewed shows a intra-articular distal radius fracture with 100% displacement of the lunate facet. ICD-10-CM ICD-9-CM    1. Other closed intra-articular fracture of distal end of left radius with routine healing, subsequent encounter  S52.572D V54.12 AMB POC XRAY, WRIST; 2 VIEWS            Plan:     Discussed range of motion exercises and wound care. Also discussed wearing a brace whenever outside of the house. Follow-up and Dispositions    Return in about 4 weeks (around 2022) for reevaluation and xrays.           Plan was reviewed with patient, who verbalized agreement and understanding of the plan

## 2022-08-24 ENCOUNTER — OFFICE VISIT (OUTPATIENT)
Dept: ORTHOPEDIC SURGERY | Age: 22
End: 2022-08-24
Payer: COMMERCIAL

## 2022-08-24 VITALS
WEIGHT: 199 LBS | HEART RATE: 93 BPM | OXYGEN SATURATION: 98 % | HEIGHT: 62 IN | TEMPERATURE: 97.1 F | BODY MASS INDEX: 36.62 KG/M2

## 2022-08-24 DIAGNOSIS — S52.572D OTHER CLOSED INTRA-ARTICULAR FRACTURE OF DISTAL END OF LEFT RADIUS WITH ROUTINE HEALING, SUBSEQUENT ENCOUNTER: ICD-10-CM

## 2022-08-24 DIAGNOSIS — Z87.81 S/P ORIF (OPEN REDUCTION INTERNAL FIXATION) FRACTURE: Primary | ICD-10-CM

## 2022-08-24 DIAGNOSIS — Z98.890 S/P ORIF (OPEN REDUCTION INTERNAL FIXATION) FRACTURE: Primary | ICD-10-CM

## 2022-08-24 PROCEDURE — 73110 X-RAY EXAM OF WRIST: CPT | Performed by: ORTHOPAEDIC SURGERY

## 2022-08-24 PROCEDURE — 99024 POSTOP FOLLOW-UP VISIT: CPT | Performed by: ORTHOPAEDIC SURGERY

## 2022-08-24 NOTE — PROGRESS NOTES
Sandra Ashley is a 20 y.o. female right handed unspecified employment. Worker's Compensation and legal considerations: none filed. Vitals:    08/24/22 0923   Pulse: 93   Temp: 97.1 °F (36.2 °C)   TempSrc: Temporal   SpO2: 98%   Weight: 199 lb (90.3 kg)   Height: 5' 2\" (1.575 m)   PainSc:   0 - No pain           Chief Complaint   Patient presents with    Surgical Follow-up     Lt distal radius         HPI: Patient presents today 6 weeks status post left distal radius open reduction internal fixation. She reports no pain or numbness or tingling. 2 weeks HPI: Patient presents today for first postoperative appointment 2-week status post left distal radius open reduction internal fixation. She reports no numbness or tingling and reports mild pain today that has been improving every day. Initial/preop HPI: Patient presents today with a history of a fall onto her left wrist.  She was recently diagnosed with a intra-articular distal radius fracture.     Date of onset: 7/3/2022    Injury: Yes: Comment: Fall    Prior Treatment:  left distal radius open reduction internal fixation    Numbness/ Tingling: No      ROS: Review of Systems - General ROS: negative  Psychological ROS: negative  ENT ROS: negative  Allergy and Immunology ROS: negative  Hematological and Lymphatic ROS: negative  Respiratory ROS: no cough, shortness of breath, or wheezing  Cardiovascular ROS: no chest pain or dyspnea on exertion  Gastrointestinal ROS: no abdominal pain, change in bowel habits, or black or bloody stools  Musculoskeletal ROS: negative  Neurological ROS: negative  Dermatological ROS: negative    Past Medical History:   Diagnosis Date    Closed fracture of radius, distal end 07/03/2022    left     Hypermobile joints     Malignant hyperthermia due to anesthesia     remote family history on father's side of the family, per pt's mother       Past Surgical History:   Procedure Laterality Date    HX WRIST FRACTURE TX Allergies   Allergen Reactions    Keflex [Cephalexin] Rash           PE:     Physical Exam  Vitals and nursing note reviewed. Constitutional:       General: She is not in acute distress. Appearance: Normal appearance. She is not ill-appearing. Cardiovascular:      Pulses: Normal pulses. Pulmonary:      Effort: Pulmonary effort is normal. No respiratory distress. Musculoskeletal:         General: No swelling, tenderness, deformity or signs of injury. Normal range of motion. Cervical back: Normal range of motion and neck supple. Right lower leg: No edema. Left lower leg: No edema. Skin:     General: Skin is warm and dry. Capillary Refill: Capillary refill takes less than 2 seconds. Findings: No bruising or erythema. Neurological:      General: No focal deficit present. Mental Status: She is alert and oriented to person, place, and time. Psychiatric:         Mood and Affect: Mood normal.         Behavior: Behavior normal.          Left wrist: Incisions healed though hypertrophic. Neurovascularly intact distally. Range of motion full. Nontender to palpation at wrist.      Imagin2022 3 views of left wrist shows a healed distal radius fracture with plate in appropriate positioning and no hardware loosening. Additionally there is no evidence of fracture displacement and there is near complete if not complete callus formation and healing.    2022 3 views of left wrist does not show any signs of hardware loosening or interval callus at this point. Hardware in appropriate position and fracture still aligned. External plain films reviewed shows a intra-articular distal radius fracture with 100% displacement of the lunate facet. ICD-10-CM ICD-9-CM    1. S/P ORIF (open reduction internal fixation) fracture  Z98.890 V45.89 AMB POC XRAY, WRIST; COMPLETE, 3+ VIE    Z87.81 V15.51       2.  Other closed intra-articular fracture of distal end of left radius with routine healing, subsequent encounter  S52.572D V54.12             Plan:     Discussed scar care including Mederma scar cream the patient can  over-the-counter. Range of motion exercises and activities as tolerated. Follow-up and Dispositions    Return if symptoms worsen or fail to improve.           Plan was reviewed with patient, who verbalized agreement and understanding of the plan No

## 2023-05-19 ENCOUNTER — TELEPHONE (OUTPATIENT)
Age: 23
End: 2023-05-19

## 2023-05-25 ENCOUNTER — OFFICE VISIT (OUTPATIENT)
Age: 23
End: 2023-05-25

## 2023-05-25 VITALS — TEMPERATURE: 97.8 F | HEIGHT: 62 IN | WEIGHT: 195 LBS | BODY MASS INDEX: 35.88 KG/M2

## 2023-05-25 DIAGNOSIS — Z87.81 S/P ORIF (OPEN REDUCTION INTERNAL FIXATION) FRACTURE: ICD-10-CM

## 2023-05-25 DIAGNOSIS — Z98.890 S/P ORIF (OPEN REDUCTION INTERNAL FIXATION) FRACTURE: ICD-10-CM

## 2023-05-25 DIAGNOSIS — M77.22 INFLAMMATION AROUND WRIST, LEFT: Primary | ICD-10-CM

## 2023-05-25 NOTE — PROGRESS NOTES
Justina Alexander is a 20 y.o. female right handed . Worker's Compensation and legal considerations: none    Chief Complaint   Patient presents with    Wrist Pain     Left wrist pain     Pain Score:   4    HPI: Patient presents today due to new onset stiffness and pain over the back of the wrist over the past few months. She is more than 10 months status post left distal radius open reduction internal fixation. She localizes the pain to the back of the wrist especially when extending the wrist.  She denies any injury since her last visit. 8/24/2022 HPI: Patient presents today 6 weeks status post left distal radius open reduction internal fixation. She reports no pain or numbness or tingling. Initial/preop HPI: Patient presents today with a history of a fall onto her left wrist.  She was recently diagnosed with a intra-articular distal radius fracture. Date of onset: April 2023  Injury: Yes: Comment: Fall preoperatively in July 2022  Prior Treatment:  Yes: Comment: Left distal radius open reduction internal fixation    ROS: Review of Systems - General ROS: negative except HPI    Past Medical History:   Diagnosis Date    Closed fracture of radius, distal end 07/03/2022    left     Hypermobile joints     Malignant hyperthermia due to anesthesia     remote family history on father's side of the family, per pt's mother       Past Surgical History:   Procedure Laterality Date    WRIST FRACTURE SURGERY          Current Outpatient Medications   Medication Sig Dispense Refill    diclofenac (VOLTAREN) 50 MG EC tablet Take 1 tablet by mouth 3 times daily (with meals) for 14 days 42 tablet 0    diclofenac (VOLTAREN) 75 MG EC tablet TAKE 1 TABLET BY MOUTH TWICE DAILY WITH MEALS FOR 10 DAYS       No current facility-administered medications for this visit.        Allergies   Allergen Reactions    Cephalexin Rash         Temp 97.8 °F (36.6 °C) (Temporal)   Ht 5' 2\" (1.575 m)   Wt 195 lb (88.5 kg)

## 2024-03-14 PROBLEM — S83.249A MEDIAL MENISCUS TEAR: Status: ACTIVE | Noted: 2024-03-14

## 2024-03-14 PROBLEM — M25.561 RIGHT KNEE PAIN: Status: ACTIVE | Noted: 2024-03-14

## 2024-03-14 NOTE — DISCHARGE INSTRUCTIONS
[facility]  Dr. Aguilar’s Post-Operative Instructions Knee Arthroscopy “Scope”    Diet:  1. Begin with liquids and light foods such as Jell-O and soups.  2. Advance as tolerated to your regular diet if not nauseated.    First 24 hours:  1. Be in the care of a responsible adult.  2. Do not drive or operate machinery.  3. Do not drink alcoholic beverages.    Activities:  1. Elevate the limb above hip and preferably above chest for 48 hours.  2. Ice should be applied to the knee in a waterproof bag for 15-30 minutes each hour while awake for first 48 hours.  3. Normal walking is encouraged after 2 days.   4. Do not engage in activities that increase your pain such as stair-climbing or prolonged standing.  5. Return to work depends on your type of employment.  6.  Follow-up with Dr. Aguilar in 7-10 days post-operatively.    Exercise:  1. Begin exercises the day of surgery for both legs and repeat hourly while awake:  *Quad sets (tightening the thigh muscles)  *?Straight leg raises (lift and hold 12-18” off bed or floor for 8 count)  *?Vigorous ankle pumps (toes towards and away from head)  2. Your routine exercises generally can be started one week after surgery as long as you can bend the knee freely to at least 90 degrees.    Wound Care:  1. Maintain your postoperative dressing. Loosen the ACE wrap if swelling of the foot or ankle occurs.  2. Remove your surgical dressing on the second post op day. Cover the wounds with Band-Aids and re-wrap the ACE bandage until swelling of knee is gone. To maintain good circulation, do not wrap too tightly.  3. Keep the surgical incisions dry until your sutures are removed when you see your doctor. Use a plastic bag with rubber bands to cover the limb during showers. Immersing the limb in water is to be avoided.    Medications:  1. Strong oral narcotic pain medications have been prescribed for the first few days. Use only as directed. No pain medication is capable of taking away all

## 2024-03-14 NOTE — H&P
Patient Name:   Mariela Martel     YOB: 2000      Chief Complaint:  Right knee pain.    History of Chief Complaint:  Mariela presents today for her right knee. It bothers her with general movement. She feels like there are lumps all over her knee, which sounds like she may get loose bodies that are mobile. It affects her gait. She gets popping and clicking. It locks frequently. It is hard for her to bend. Her pain is a 6 to 7/10, sharp. It affects her activities of daily living and general athletic activities. 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 She has high-grade cartilage loss over the medial femoral condyle. She has high-grade cartilage loss over the inferolateral patella. She has a bucket-handle tear of the medial meniscus with a flipped fragment into the intracondylar notch. There is medial extrusion of the body, and there is moderate knee effusion noted. We have discussed these findings. We have discussed partial medial meniscectomy via right knee arthroscopy with chondroplasty of the patella, removal of any intraarticular loose bodies, and possibly an arthroscopic lateral release, where she has significant tilting and lateral deviation of the patella. We have discussed this with her. She is considering that option and may want to move forward very soon.    Past Medical/Surgical History:    Disease/Disorder Date Side Surgery Date Side Comment   Osteoarthritis         Osteochondritis dissecans         Fracture of distal radius  left ORIF 07/12/2022 left      Allergies:    Ingredient Reaction Medication Name Comment   CEPHALEXIN MONOHYDRATE  Keflex      Current Medications:    Medication Directions   escitalopram 10 mg tablet TAKE 1 TABLET BY MOUTH EVERY DAY   ibuprofen      Social History:    SMOKING  Status Tobacco Type Units Per Day Yrs Used   Never smoker        ALCOHOL  There is no

## 2024-03-15 ENCOUNTER — HOSPITAL ENCOUNTER (OUTPATIENT)
Facility: HOSPITAL | Age: 24
Discharge: HOME | End: 2024-03-18

## 2024-03-15 ENCOUNTER — ANESTHESIA EVENT (OUTPATIENT)
Facility: HOSPITAL | Age: 24
End: 2024-03-15
Payer: COMMERCIAL

## 2024-03-15 LAB — SENTARA SPECIMEN COLLECTION: NORMAL

## 2024-03-15 PROCEDURE — 99001 SPECIMEN HANDLING PT-LAB: CPT

## 2024-03-15 NOTE — PERIOP NOTE
Posting notified (Judy) of positive family history of malignant hyperthermia per request of ATTILA Graham MDA

## 2024-03-16 LAB
INR BLD: 0.94 (ref 0.89–1.29)
PROTHROMBIN TIME: 10.5 SEC (ref 9–13)

## 2024-03-18 ENCOUNTER — HOSPITAL ENCOUNTER (OUTPATIENT)
Facility: HOSPITAL | Age: 24
Setting detail: OUTPATIENT SURGERY
Discharge: HOME | End: 2024-03-18
Attending: ORTHOPAEDIC SURGERY | Admitting: ORTHOPAEDIC SURGERY
Payer: COMMERCIAL

## 2024-03-18 ENCOUNTER — ANESTHESIA (OUTPATIENT)
Facility: HOSPITAL | Age: 24
End: 2024-03-18
Payer: COMMERCIAL

## 2024-03-18 VITALS
HEART RATE: 86 BPM | HEIGHT: 62 IN | DIASTOLIC BLOOD PRESSURE: 77 MMHG | RESPIRATION RATE: 16 BRPM | BODY MASS INDEX: 40.98 KG/M2 | TEMPERATURE: 97.6 F | SYSTOLIC BLOOD PRESSURE: 111 MMHG | OXYGEN SATURATION: 97 % | WEIGHT: 222.7 LBS

## 2024-03-18 DIAGNOSIS — G89.29 CHRONIC PAIN OF RIGHT KNEE: Primary | ICD-10-CM

## 2024-03-18 DIAGNOSIS — M25.561 CHRONIC PAIN OF RIGHT KNEE: Primary | ICD-10-CM

## 2024-03-18 LAB — HCG UR QL: NEGATIVE

## 2024-03-18 PROCEDURE — 7100000000 HC PACU RECOVERY - FIRST 15 MIN: Performed by: ORTHOPAEDIC SURGERY

## 2024-03-18 PROCEDURE — 3600000012 HC SURGERY LEVEL 2 ADDTL 15MIN: Performed by: ORTHOPAEDIC SURGERY

## 2024-03-18 PROCEDURE — 6360000002 HC RX W HCPCS: Performed by: ORTHOPAEDIC SURGERY

## 2024-03-18 PROCEDURE — 6360000002 HC RX W HCPCS: Performed by: REGISTERED NURSE

## 2024-03-18 PROCEDURE — 7100000011 HC PHASE II RECOVERY - ADDTL 15 MIN: Performed by: ORTHOPAEDIC SURGERY

## 2024-03-18 PROCEDURE — 2580000003 HC RX 258: Performed by: ORTHOPAEDIC SURGERY

## 2024-03-18 PROCEDURE — 2500000003 HC RX 250 WO HCPCS: Performed by: REGISTERED NURSE

## 2024-03-18 PROCEDURE — 7100000001 HC PACU RECOVERY - ADDTL 15 MIN: Performed by: ORTHOPAEDIC SURGERY

## 2024-03-18 PROCEDURE — 81025 URINE PREGNANCY TEST: CPT

## 2024-03-18 PROCEDURE — 3700000001 HC ADD 15 MINUTES (ANESTHESIA): Performed by: ORTHOPAEDIC SURGERY

## 2024-03-18 PROCEDURE — 6360000002 HC RX W HCPCS: Performed by: SPECIALIST

## 2024-03-18 PROCEDURE — 7100000010 HC PHASE II RECOVERY - FIRST 15 MIN: Performed by: ORTHOPAEDIC SURGERY

## 2024-03-18 PROCEDURE — 2720000010 HC SURG SUPPLY STERILE: Performed by: ORTHOPAEDIC SURGERY

## 2024-03-18 PROCEDURE — 3600000002 HC SURGERY LEVEL 2 BASE: Performed by: ORTHOPAEDIC SURGERY

## 2024-03-18 PROCEDURE — 2500000003 HC RX 250 WO HCPCS: Performed by: ORTHOPAEDIC SURGERY

## 2024-03-18 PROCEDURE — 3700000000 HC ANESTHESIA ATTENDED CARE: Performed by: ORTHOPAEDIC SURGERY

## 2024-03-18 PROCEDURE — 2709999900 HC NON-CHARGEABLE SUPPLY: Performed by: ORTHOPAEDIC SURGERY

## 2024-03-18 RX ORDER — HYDROMORPHONE HYDROCHLORIDE 1 MG/ML
0.25 INJECTION, SOLUTION INTRAMUSCULAR; INTRAVENOUS; SUBCUTANEOUS EVERY 5 MIN PRN
Status: DISCONTINUED | OUTPATIENT
Start: 2024-03-18 | End: 2024-03-18 | Stop reason: HOSPADM

## 2024-03-18 RX ORDER — FENTANYL CITRATE 50 UG/ML
INJECTION, SOLUTION INTRAMUSCULAR; INTRAVENOUS PRN
Status: DISCONTINUED | OUTPATIENT
Start: 2024-03-18 | End: 2024-03-18 | Stop reason: SDUPTHER

## 2024-03-18 RX ORDER — SODIUM CHLORIDE 0.9 % (FLUSH) 0.9 %
5-40 SYRINGE (ML) INJECTION PRN
Status: DISCONTINUED | OUTPATIENT
Start: 2024-03-18 | End: 2024-03-18 | Stop reason: HOSPADM

## 2024-03-18 RX ORDER — ONDANSETRON 2 MG/ML
4 INJECTION INTRAMUSCULAR; INTRAVENOUS
Status: DISCONTINUED | OUTPATIENT
Start: 2024-03-18 | End: 2024-03-18 | Stop reason: HOSPADM

## 2024-03-18 RX ORDER — ONDANSETRON 2 MG/ML
INJECTION INTRAMUSCULAR; INTRAVENOUS PRN
Status: DISCONTINUED | OUTPATIENT
Start: 2024-03-18 | End: 2024-03-18 | Stop reason: SDUPTHER

## 2024-03-18 RX ORDER — SODIUM CHLORIDE 9 MG/ML
INJECTION, SOLUTION INTRAVENOUS PRN
Status: DISCONTINUED | OUTPATIENT
Start: 2024-03-18 | End: 2024-03-18 | Stop reason: HOSPADM

## 2024-03-18 RX ORDER — NALOXONE HYDROCHLORIDE 0.4 MG/ML
INJECTION, SOLUTION INTRAMUSCULAR; INTRAVENOUS; SUBCUTANEOUS PRN
Status: DISCONTINUED | OUTPATIENT
Start: 2024-03-18 | End: 2024-03-18 | Stop reason: HOSPADM

## 2024-03-18 RX ORDER — PROPOFOL 10 MG/ML
INJECTION, EMULSION INTRAVENOUS CONTINUOUS PRN
Status: DISCONTINUED | OUTPATIENT
Start: 2024-03-18 | End: 2024-03-18 | Stop reason: SDUPTHER

## 2024-03-18 RX ORDER — HYDROCODONE BITARTRATE AND ACETAMINOPHEN 5; 325 MG/1; MG/1
1 TABLET ORAL EVERY 6 HOURS PRN
Qty: 20 TABLET | Refills: 0 | Status: SHIPPED | OUTPATIENT
Start: 2024-03-18 | End: 2024-03-23

## 2024-03-18 RX ORDER — PHENYLEPHRINE HCL IN 0.9% NACL 1 MG/10 ML
SYRINGE (ML) INTRAVENOUS PRN
Status: DISCONTINUED | OUTPATIENT
Start: 2024-03-18 | End: 2024-03-18 | Stop reason: SDUPTHER

## 2024-03-18 RX ORDER — KETAMINE HCL IN NACL, ISO-OSM 100MG/10ML
SYRINGE (ML) INJECTION PRN
Status: DISCONTINUED | OUTPATIENT
Start: 2024-03-18 | End: 2024-03-18 | Stop reason: SDUPTHER

## 2024-03-18 RX ORDER — LABETALOL HYDROCHLORIDE 5 MG/ML
10 INJECTION, SOLUTION INTRAVENOUS
Status: DISCONTINUED | OUTPATIENT
Start: 2024-03-18 | End: 2024-03-18 | Stop reason: HOSPADM

## 2024-03-18 RX ORDER — SODIUM CHLORIDE, SODIUM LACTATE, POTASSIUM CHLORIDE, CALCIUM CHLORIDE 600; 310; 30; 20 MG/100ML; MG/100ML; MG/100ML; MG/100ML
INJECTION, SOLUTION INTRAVENOUS CONTINUOUS
Status: DISCONTINUED | OUTPATIENT
Start: 2024-03-18 | End: 2024-03-18 | Stop reason: HOSPADM

## 2024-03-18 RX ORDER — MIDAZOLAM HYDROCHLORIDE 1 MG/ML
INJECTION INTRAMUSCULAR; INTRAVENOUS PRN
Status: DISCONTINUED | OUTPATIENT
Start: 2024-03-18 | End: 2024-03-18 | Stop reason: SDUPTHER

## 2024-03-18 RX ORDER — BUPIVACAINE HYDROCHLORIDE AND EPINEPHRINE 5; 5 MG/ML; UG/ML
INJECTION, SOLUTION EPIDURAL; INTRACAUDAL; PERINEURAL PRN
Status: DISCONTINUED | OUTPATIENT
Start: 2024-03-18 | End: 2024-03-18 | Stop reason: ALTCHOICE

## 2024-03-18 RX ORDER — GLYCOPYRROLATE 0.2 MG/ML
INJECTION INTRAMUSCULAR; INTRAVENOUS PRN
Status: DISCONTINUED | OUTPATIENT
Start: 2024-03-18 | End: 2024-03-18 | Stop reason: SDUPTHER

## 2024-03-18 RX ORDER — DIPHENHYDRAMINE HYDROCHLORIDE 50 MG/ML
12.5 INJECTION INTRAMUSCULAR; INTRAVENOUS
Status: DISCONTINUED | OUTPATIENT
Start: 2024-03-18 | End: 2024-03-18 | Stop reason: HOSPADM

## 2024-03-18 RX ORDER — CLINDAMYCIN HYDROCHLORIDE 150 MG/1
150 CAPSULE ORAL 3 TIMES DAILY
Qty: 21 CAPSULE | Refills: 0 | Status: SHIPPED | OUTPATIENT
Start: 2024-03-18 | End: 2024-03-25

## 2024-03-18 RX ORDER — CLINDAMYCIN PHOSPHATE 900 MG/50ML
900 INJECTION, SOLUTION INTRAVENOUS
Status: COMPLETED | OUTPATIENT
Start: 2024-03-18 | End: 2024-03-18

## 2024-03-18 RX ORDER — FENTANYL CITRATE 50 UG/ML
25 INJECTION, SOLUTION INTRAMUSCULAR; INTRAVENOUS EVERY 5 MIN PRN
Status: DISCONTINUED | OUTPATIENT
Start: 2024-03-18 | End: 2024-03-18 | Stop reason: HOSPADM

## 2024-03-18 RX ORDER — SODIUM CHLORIDE 0.9 % (FLUSH) 0.9 %
5-40 SYRINGE (ML) INJECTION EVERY 12 HOURS SCHEDULED
Status: DISCONTINUED | OUTPATIENT
Start: 2024-03-18 | End: 2024-03-18 | Stop reason: HOSPADM

## 2024-03-18 RX ORDER — MORPHINE SULFATE 4 MG/ML
INJECTION, SOLUTION INTRAMUSCULAR; INTRAVENOUS PRN
Status: DISCONTINUED | OUTPATIENT
Start: 2024-03-18 | End: 2024-03-18 | Stop reason: ALTCHOICE

## 2024-03-18 RX ORDER — DEXMEDETOMIDINE HYDROCHLORIDE 100 UG/ML
INJECTION, SOLUTION INTRAVENOUS PRN
Status: DISCONTINUED | OUTPATIENT
Start: 2024-03-18 | End: 2024-03-18 | Stop reason: SDUPTHER

## 2024-03-18 RX ORDER — OXYCODONE HYDROCHLORIDE 5 MG/1
5 TABLET ORAL
Status: DISCONTINUED | OUTPATIENT
Start: 2024-03-18 | End: 2024-03-18 | Stop reason: HOSPADM

## 2024-03-18 RX ORDER — DROPERIDOL 2.5 MG/ML
0.62 INJECTION, SOLUTION INTRAMUSCULAR; INTRAVENOUS
Status: DISCONTINUED | OUTPATIENT
Start: 2024-03-18 | End: 2024-03-18 | Stop reason: HOSPADM

## 2024-03-18 RX ADMIN — FENTANYL CITRATE 50 MCG: 50 INJECTION, SOLUTION INTRAMUSCULAR; INTRAVENOUS at 07:32

## 2024-03-18 RX ADMIN — Medication 30 MG: at 07:43

## 2024-03-18 RX ADMIN — CLINDAMYCIN PHOSPHATE 900 MG: 900 INJECTION, SOLUTION INTRAVENOUS at 07:39

## 2024-03-18 RX ADMIN — DEXMEDETOMIDINE HYDROCHLORIDE 10 MCG: 100 INJECTION, SOLUTION INTRAVENOUS at 07:35

## 2024-03-18 RX ADMIN — GLYCOPYRROLATE 0.2 MG: 0.2 INJECTION INTRAMUSCULAR; INTRAVENOUS at 08:06

## 2024-03-18 RX ADMIN — DEXMEDETOMIDINE HYDROCHLORIDE 6 MCG: 100 INJECTION, SOLUTION INTRAVENOUS at 07:50

## 2024-03-18 RX ADMIN — Medication 50 MCG: at 08:19

## 2024-03-18 RX ADMIN — SODIUM CHLORIDE, POTASSIUM CHLORIDE, SODIUM LACTATE AND CALCIUM CHLORIDE: 600; 310; 30; 20 INJECTION, SOLUTION INTRAVENOUS at 06:26

## 2024-03-18 RX ADMIN — MIDAZOLAM 2 MG: 1 INJECTION INTRAMUSCULAR; INTRAVENOUS at 07:32

## 2024-03-18 RX ADMIN — HYDROMORPHONE HYDROCHLORIDE 0.25 MG: 1 INJECTION, SOLUTION INTRAMUSCULAR; INTRAVENOUS; SUBCUTANEOUS at 09:08

## 2024-03-18 RX ADMIN — PROPOFOL 200 MG: 10 INJECTION, EMULSION INTRAVENOUS at 07:36

## 2024-03-18 RX ADMIN — SODIUM CHLORIDE, POTASSIUM CHLORIDE, SODIUM LACTATE AND CALCIUM CHLORIDE: 600; 310; 30; 20 INJECTION, SOLUTION INTRAVENOUS at 08:39

## 2024-03-18 RX ADMIN — Medication 20 MG: at 07:36

## 2024-03-18 RX ADMIN — PROPOFOL 200 MCG/KG/MIN: 10 INJECTION, EMULSION INTRAVENOUS at 07:40

## 2024-03-18 RX ADMIN — FENTANYL CITRATE 50 MCG: 50 INJECTION, SOLUTION INTRAMUSCULAR; INTRAVENOUS at 07:39

## 2024-03-18 RX ADMIN — ONDANSETRON HYDROCHLORIDE 4 MG: 2 INJECTION INTRAMUSCULAR; INTRAVENOUS at 07:42

## 2024-03-18 ASSESSMENT — PAIN DESCRIPTION - FREQUENCY
FREQUENCY: CONTINUOUS

## 2024-03-18 ASSESSMENT — PAIN DESCRIPTION - PAIN TYPE
TYPE: SURGICAL PAIN

## 2024-03-18 ASSESSMENT — PAIN DESCRIPTION - LOCATION
LOCATION: KNEE

## 2024-03-18 ASSESSMENT — PAIN SCALES - GENERAL
PAINLEVEL_OUTOF10: 8
PAINLEVEL_OUTOF10: 5
PAINLEVEL_OUTOF10: 0
PAINLEVEL_OUTOF10: 5
PAINLEVEL_OUTOF10: 0
PAINLEVEL_OUTOF10: 5
PAINLEVEL_OUTOF10: 5

## 2024-03-18 ASSESSMENT — PAIN - FUNCTIONAL ASSESSMENT
PAIN_FUNCTIONAL_ASSESSMENT: 0-10
PAIN_FUNCTIONAL_ASSESSMENT: ACTIVITIES ARE NOT PREVENTED

## 2024-03-18 ASSESSMENT — PAIN DESCRIPTION - ONSET
ONSET: ON-GOING

## 2024-03-18 ASSESSMENT — PAIN DESCRIPTION - ORIENTATION
ORIENTATION: RIGHT

## 2024-03-18 ASSESSMENT — PAIN DESCRIPTION - DESCRIPTORS
DESCRIPTORS: ACHING

## 2024-03-18 NOTE — ANESTHESIA PRE PROCEDURE
Department of Anesthesiology  Preprocedure Note       Name:  Mariela Martel   Age:  23 y.o.  :  2000                                          MRN:  [id number]         Date:  3/18/2024      Surgeon: Surgeon(s):  Allen Aguilar MD    Procedure: Procedure(s):  RIGHT KNEE ARTHROSCOPY/PARTIAL MEDIAL MENISCECTOMY WITH ARTHROSCOPIC LATERAL RELEASE AND LOOSE BODY RESECTION (SPEC POP) PT HAS FAMILY HISTORY OF MALIGNANT HYPERTHERMIA- MUST BE FIRST CASE OF THE DAY    Medications prior to admission:   Prior to Admission medications    Not on File       Current medications:    Current Facility-Administered Medications   Medication Dose Route Frequency Provider Last Rate Last Admin    clindamycin (CLEOCIN) 900 mg in dextrose 5 % 50 mL IVPB  900 mg IntraVENous On Call to OR Allen Aguilar MD        lactated ringers IV soln infusion   IntraVENous Continuous Allen Aguilar  mL/hr at 24 0626 New Bag at 24 0626       Allergies:    Allergies   Allergen Reactions    Cephalexin Rash       Problem List:    Patient Active Problem List   Diagnosis Code    Closed die punch fracture of distal radius, left, initial encounter S52.572A    Medial meniscus tear S83.249A    Right knee pain M25.561       Past Medical History:        Diagnosis Date    Arthritis     osteoarthritis    Closed fracture of radius, distal end 2022    left     Hypermobile joints     Malignant hyperthermia     Malignant hyperthermia due to anesthesia     remote family history on father's side of the family, per pt's mother       Past Surgical History:        Procedure Laterality Date    WRIST FRACTURE SURGERY         Social History:    Social History     Tobacco Use    Smoking status: Never     Passive exposure: Never    Smokeless tobacco: Never   Substance Use Topics    Alcohol use: Never                                Counseling given: Not Answered      Vital Signs (Current):   Vitals:    24 0547   BP: (!) 136/98   Pulse:

## 2024-03-18 NOTE — OP NOTE
OPERATIVE NOTE    Patient: Mariela Martel MRN: [id number]  SSN: [id number]    YOB: 2000  Age: 23 y.o.  Sex: female      Indications: This is a 23 y.o. year-old female who presents with knee pain. She was positive for meniscal tear on MRI. The patient was admitted for surgery as conservative measures have failed.    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]  Loose body of right knee [M23.41]    Postoperative Diagnosis: * No post-op diagnosis entered *      Procedure: Procedure(s):  RIGHT KNEE ARTHROSCOPY/PARTIAL MEDIAL MENISCECTOMY WITH ARTHROSCOPIC LATERAL RELEASE AND LOOSE BODY RESECTION (SPEC POP) PT HAS FAMILY HISTORY OF MALIGNANT HYPERTHERMIA- MUST BE FIRST CASE OF THE DAY    Surgeon(s) and Role:     * Allen Aguilar MD - Primary    Anesthesia: @ORANEST    Assistant: Circulator: Pilar Brandt RN  Scrub Person First: Jared Bradley  Scrub Person Second: Tushar Holguin    Estimated Blood Loss:<10cc    Specimens: * No specimens in log *     Drains: none    Implants: * No implants in log *    Complications: None; patient tolerated the procedure well.    Procedure: The patient was greeted by anesthesia and taken to the operative suite, where she underwent general endotracheal anesthesia.  The patient was positioned in the supine position on a standard orthopedic table. The right leg was secured with a surgical assist leg martinez and sterilely prepped and draped in standard fashion.  Standard inferomedial and inferolateral portals were made with a 15-blade scalpel.  A 30-degree arthroscope was placed through the lateral portal into the suprapatellar  Pouch.  Evaluation of the patellofemoral compartment showed grade 2 changes of the cartilage surface.  Changes were primarily lateral with a lateral tilt of the patella.  A 2inch lateral release was performed

## 2024-03-18 NOTE — PERIOP NOTE
TRANSFER - IN REPORT:    Verbal report received from BJORN Smith (name) on Mariela Martel  being received from PACU (unit) for routine progression of patient care      Report consisted of patient’s Situation, Background, Assessment and   Recommendations(SBAR).     Information from the following report(s) Nurse Handoff Report, Surgery Report, Intake/Output, and MAR was reviewed with the receiving nurse.    Opportunity for questions and clarification was provided.      Assessment completed upon patient’s arrival to unit and care assume

## 2024-03-18 NOTE — PERIOP NOTE
TRANSFER - OUT REPORT:    Verbal report given to BJORN Eric on Mariela Martel  being transferred to phase 2 for routine post-op       Report consisted of patient's Situation, Background, Assessment and   Recommendations(SBAR).     Information from the following report(s) Surgery Report was reviewed with the receiving nurse.           Lines:   Peripheral IV 03/18/24 Right Wrist (Active)   Site Assessment Clean, dry & intact 03/18/24 0844   Line Status Infusing 03/18/24 0844   Infiltration Assessment 0 03/18/24 0844        Opportunity for questions and clarification was provided.      Patient transported with:  Registered Nurse

## 2024-03-18 NOTE — INTERVAL H&P NOTE
Update History & Physical    The patient's History and Physical of March 18, chart was reviewed with the patient and I examined the patient. There was no change. The surgical site was confirmed by the patient and me.     Plan: The risks, benefits, expected outcome, and alternative to the recommended procedure have been discussed with the patient. Patient understands and wants to proceed with the procedure.     Electronically signed by Allen Calhoun MD on 3/18/2024 at 7:15 AM

## 2024-03-18 NOTE — ANESTHESIA POSTPROCEDURE EVALUATION
Post-Anesthesia Evaluation and Assessment    Cardiovascular Function/Vital Signs/Pain Score:  Vitals  BP: (!) 102/57  Temp: 97 °F (36.1 °C)  Temp Source: Temporal  Pulse: 81  Respirations: 16  SpO2: 100 %  Height: 157.5 cm (5' 2\")  Weight - Scale: 101 kg (222 lb 11.2 oz)  Pain Level: 0    Patient is status post Procedure(s):  RIGHT KNEE ARTHROSCOPY /PARTIAL MEDIAL MENISCECTOMY, LATERAL RELEASE, LOOSE BODY RESECTION, AND CHONDROPLASTY (SPEC POP) PT HAS FAMILY HISTORY OF MALIGNANT HYPERTHERMIA- MUST BE FIRST CASE OF THE DAY.    Nausea/Vomiting: Controlled.    Postoperative hydration reviewed and adequate.    Pain:  Managed    Mental Status and Level of Consciousness: Baseline and appropriate for discharge.    Adequate oxygenation and airway patent.    Complications related to anesthesia: None    Post-anesthesia assessment completed. No concerns.    Patient has met all discharge requirements.    Signed By: Luca Ornelas MD    March 18, 2024

## 2024-03-18 NOTE — PERIOP NOTE
TRANSFER - IN REPORT:    Verbal report received from Pilar Solorio CRNA RN  on Belfonte R Delonte  being received from OR  for routine progression of patient care      Report consisted of patient's Situation, Background, Assessment and   Recommendations(SBAR).     Information from the following report(s) Adult Overview, Surgery Report, Intake/Output, and MAR was reviewed with the receiving nurse.    Opportunity for questions and clarification was provided.      Assessment completed upon patient's arrival to unit and care assumed.

## (undated) DEVICE — INTENDED FOR TISSUE SEPARATION, AND OTHER PROCEDURES THAT REQUIRE A SHARP SURGICAL BLADE TO PUNCTURE OR CUT.: Brand: BARD-PARKER ®  SAFETY SCALPED

## (undated) DEVICE — KIT CLN UP BON SECOURS MARYV

## (undated) DEVICE — STERILE POLYISOPRENE POWDER-FREE SURGICAL GLOVES: Brand: PROTEXIS

## (undated) DEVICE — BIPOLAR FORCEPS CORD: Brand: VALLEYLAB

## (undated) DEVICE — HOOK LOCK LATEX FREE ELASTIC BANDAGE 3INX5YD

## (undated) DEVICE — GLOVE SURG SZ 9 THK91MIL LTX FREE SYN POLYISOPRENE ANTI

## (undated) DEVICE — CURITY NON-ADHERENT STRIPS: Brand: CURITY

## (undated) DEVICE — BLANKET WRM AD W50XL85.8IN PACU FULL BODY FORC AIR

## (undated) DEVICE — PACK PROCEDURE SURG KNEE ARTHSCP CUST

## (undated) DEVICE — DRAPE EQUIP CARM MINI STRL

## (undated) DEVICE — BANDAGE COMPR W6INXL10YD ST M E WHITE/BEIGE

## (undated) DEVICE — BANDAGE,ELASTIC,ESMARK,STERILE,4"X9',LF: Brand: MEDLINE

## (undated) DEVICE — GAUZE,SPONGE,4"X4",16PLY,STRL,LF,10/TRAY: Brand: MEDLINE

## (undated) DEVICE — SOLUTION IV 1000ML 0.9% SOD CHL

## (undated) DEVICE — 2.3MM X 26MM NON-TOGGLING CORTICAL SCREW
Type: IMPLANTABLE DEVICE | Site: WRIST | Status: NON-FUNCTIONAL
Brand: ACUMED
Removed: 2022-07-12

## (undated) DEVICE — 2.8MM QR DRILL, W/ DEPTH MARKS: Brand: ACUMED

## (undated) DEVICE — 1.5MM HEX DRIVER TIP, LOCKING GROOVE: Brand: ACUMED

## (undated) DEVICE — BLADE OPHTH MINI BEAV SHRP --

## (undated) DEVICE — DRAPE,HAND,STERILE: Brand: MEDLINE

## (undated) DEVICE — 2.0MM QUICK RELEASE DRILL: Brand: ACUMED

## (undated) DEVICE — INTENDED FOR TISSUE SEPARATION, AND OTHER PROCEDURES THAT REQUIRE A SHARP SURGICAL BLADE TO PUNCTURE OR CUT.: Brand: BARD-PARKER SAFETY BLADES SIZE 15, STERILE

## (undated) DEVICE — BNDG CMPR ELAS KNT VEL STD 3IN -- MEDICHOICE

## (undated) DEVICE — SUTURE MCRYL SZ 3-0 L27IN ABSRB UD L26MM SH 1/2 CIR Y416H

## (undated) DEVICE — SUPER TURBOVAC 90 INTEGRATED CABLE WAND ICW: Brand: COBLATION

## (undated) DEVICE — CAST ORTHOPEDIC 4 YDX4 IN FIBERGLASS WHT ELTA-LITE

## (undated) DEVICE — TUBE IRRIG L8IN LNG PT W/ CONN FOR PMP SYS REDEUCE

## (undated) DEVICE — T15 STICK FIT HEXALOBE DRIVER: Brand: ACUMED

## (undated) DEVICE — IMMOB SHLDR W/WAIST STRP M --

## (undated) DEVICE — STRETCH BANDAGE ROLL: Brand: DERMACEA

## (undated) DEVICE — PADDING CAST COHESIVE 4 YDX3 IN HND TEARABLE COTTON SPEC 100

## (undated) DEVICE — NEEDLE NRV BLK 22GA L2IN 30DEG INSUL BVL EXTN SET STIMUPLEX

## (undated) DEVICE — 4.5 MM INCISOR PLUS STRAIGHT                                    BLADES, POWER/EP-1, VIOLET, PACKAGED                                    6 PER BOX, STERILE

## (undated) DEVICE — APPLICATOR MEDICATED 26 CC SOLUTION HI LT ORNG CHLORAPREP

## (undated) DEVICE — BANDAGE COMPR EXSANGUATION SGL LAYERED NO CLSR 9FT LEN 4IN W

## (undated) DEVICE — PREP SKN CHLRAPRP APL 26ML STR --

## (undated) DEVICE — THREE-QUARTER SHEET: Brand: CONVERTORS

## (undated) DEVICE — GARMENT,MEDLINE,DVT,INT,CALF,MED, GEN2: Brand: MEDLINE

## (undated) DEVICE — SUT MCRYL + 4-0 18IN PS-2 UND --

## (undated) DEVICE — GLOVE SURG SZ 65 L12IN FNGR THK79MIL GRN LTX FREE

## (undated) DEVICE — GARMENT,MEDLINE,DVT,INT,CALF,LG, GEN2: Brand: MEDLINE

## (undated) DEVICE — IMMOBILIZER SHLDR L L18IN D9IN UNIV POLY COT W/ FOAM STRP

## (undated) DEVICE — TUBING PMP L8FT LNG W/ CONN FOR AR-6400 REDEUCE

## (undated) DEVICE — GOWN,PREVENTION PLUS,XLN/XL,ST,24/CS: Brand: MEDLINE

## (undated) DEVICE — DERMABOND SKIN ADH 0.7ML -- DERMABOND ADVANCED 12/BX

## (undated) DEVICE — .054" X 6" GUIDE WIRE: Brand: ACUMED

## (undated) DEVICE — PAD,ABDOMINAL,8"X10",ST,LF: Brand: MEDLINE

## (undated) DEVICE — SOLUTION IRRIG 3000ML LAC R FLX CONT

## (undated) DEVICE — SUTURE ETHLN SZ 3-0 L18IN NONABSORBABLE BLK PS-2 L19MM 3/8 1669H